# Patient Record
Sex: FEMALE | Race: WHITE | URBAN - METROPOLITAN AREA
[De-identification: names, ages, dates, MRNs, and addresses within clinical notes are randomized per-mention and may not be internally consistent; named-entity substitution may affect disease eponyms.]

---

## 2017-03-20 NOTE — PROGRESS NOTES
[facility] [place]  PROGRESS NOTE  Chief Complaint:   Patient presents with:  Medication Follow-Up: Needs refills      HPI:   This is a 27year old female presents for follow-up on depression and anxiety.   Patient has been tolerating medication chest discomfort, palpitations, edema, dyspnea on exertion or at rest.  RESPIRATORY:  Denies shortness of breath, wheezing, cough or sputum. GASTROINTESTINAL:  Denies abdominal pain, nausea, vomiting, constipation, diarrhea, or blood in stool.   Marilin Gill daily.  -     ALPRAZolam (XANAX) 0.25 MG Oral Tab; Take 1 tablet (0.25 mg total) by mouth 3 (three) times daily as needed for Sleep. Patient Instructions   Increase fluoxetine. Use xanax as needed. Consider seeing a counselor.    Advice healthy diet

## 2017-03-20 NOTE — PATIENT INSTRUCTIONS
Increase fluoxetine. Use xanax as needed. Consider seeing a counselor. Advice healthy diet and exercise.

## 2017-04-27 ENCOUNTER — TELEPHONE (OUTPATIENT)
Dept: FAMILY MEDICINE CLINIC | Facility: CLINIC | Age: 27
End: 2017-04-27

## 2017-04-27 RX ORDER — ALPRAZOLAM 0.25 MG/1
0.25 TABLET ORAL 3 TIMES DAILY PRN
Qty: 30 TABLET | Refills: 1 | Status: SHIPPED | OUTPATIENT
Start: 2017-04-27 | End: 2017-06-15

## 2017-04-27 NOTE — TELEPHONE ENCOUNTER
Future Appointments  Date Time Provider Nuzhat Tammy   6/21/2017 8:30 AM Kait Zapata MD [facility] [place] [facility]     Return in about 3 months (around 6/20/2017).

## 2017-06-05 ENCOUNTER — LAB ENCOUNTER (OUTPATIENT)
Dept: LAB | Age: 27
End: 2017-06-05
Attending: FAMILY MEDICINE
Payer: COMMERCIAL

## 2017-06-05 ENCOUNTER — OFFICE VISIT (OUTPATIENT)
Dept: FAMILY MEDICINE CLINIC | Facility: CLINIC | Age: 27
End: 2017-06-05

## 2017-06-05 ENCOUNTER — TELEPHONE (OUTPATIENT)
Dept: FAMILY MEDICINE CLINIC | Facility: CLINIC | Age: 27
End: 2017-06-05

## 2017-06-05 VITALS
SYSTOLIC BLOOD PRESSURE: 118 MMHG | HEART RATE: 80 BPM | TEMPERATURE: 98 F | BODY MASS INDEX: 45.15 KG/M2 | DIASTOLIC BLOOD PRESSURE: 76 MMHG | WEIGHT: 271 LBS | RESPIRATION RATE: 14 BRPM | HEIGHT: 65 IN

## 2017-06-05 DIAGNOSIS — S09.90XA HEAD TRAUMA, INITIAL ENCOUNTER: Primary | ICD-10-CM

## 2017-06-05 DIAGNOSIS — S09.90XA HEAD TRAUMA, INITIAL ENCOUNTER: ICD-10-CM

## 2017-06-05 DIAGNOSIS — G44.319 ACUTE POST-TRAUMATIC HEADACHE, NOT INTRACTABLE: ICD-10-CM

## 2017-06-05 DIAGNOSIS — R11.0 NAUSEA: ICD-10-CM

## 2017-06-05 PROCEDURE — 36415 COLL VENOUS BLD VENIPUNCTURE: CPT

## 2017-06-05 PROCEDURE — 99214 OFFICE O/P EST MOD 30 MIN: CPT | Performed by: NURSE PRACTITIONER

## 2017-06-05 PROCEDURE — 80048 BASIC METABOLIC PNL TOTAL CA: CPT

## 2017-06-05 PROCEDURE — 85025 COMPLETE CBC W/AUTO DIFF WBC: CPT

## 2017-06-05 RX ORDER — NAPROXEN 500 MG/1
500 TABLET ORAL 2 TIMES DAILY WITH MEALS
Qty: 20 TABLET | Refills: 0 | Status: SHIPPED | OUTPATIENT
Start: 2017-06-05 | End: 2017-09-15 | Stop reason: ALTCHOICE

## 2017-06-05 RX ORDER — NORGESTIMATE-ETHINYL ESTRADIOL 7DAYSX3 28
TABLET ORAL
COMMUNITY
Start: 2017-06-02 | End: 2018-06-20

## 2017-06-05 NOTE — PROGRESS NOTES
[address]  PROGRESS NOTE      Chief Complaint:   Patient presents with:  Headache: hit head on monday the ground on monday has had headache ever since and nausea        HPI:   Alberto Baxter is a 32year old female who presents for c/o Disp: 30 tablet Rfl: 1   FLUoxetine HCl 40 MG Oral Cap Take 1 capsule (40 mg total) by mouth daily.  Disp: 30 capsule Rfl: 2      Past Medical History   Diagnosis Date   • Anxiety    • Depression    • Palpitations           Past Surgical History    CHOLECYS Scale (eye, verbal, motor) score: 15, gait normal, heel to shin test normal.  Psych: A & Ox3, tearing up in room--states this is scary information and she should have come in sooner.     ASSESSMENT AND PLAN:   Facundo Adorno is a 32year old female who pres understanding of these issues and agrees to the plan. The patient is asked to return if sx's persist or worsen.     TIFFANIE Laguna

## 2017-06-05 NOTE — PATIENT INSTRUCTIONS
Recommend physical and cognitive rest. No mental strain including none to limited screening time, loud music, computer or phone time, etc.  If any change in mental status, vomiting, loss of consciousness, severe headache, difficulty staying awake or arouse

## 2017-06-05 NOTE — TELEPHONE ENCOUNTER
Patient calling wanting referral to be sent to [facility] in [address] [place] [address] Dr. Gama Bolden is not in network. Please advise?

## 2017-06-06 ENCOUNTER — TELEPHONE (OUTPATIENT)
Dept: FAMILY MEDICINE CLINIC | Facility: CLINIC | Age: 27
End: 2017-06-06

## 2017-06-06 NOTE — TELEPHONE ENCOUNTER
Patient notified of results and recommendations, states insurance information was given at .  Staff message sent to [facility] central referral for prior auth approval.

## 2017-06-06 NOTE — TELEPHONE ENCOUNTER
Patient notified insurance is out of network would have to be self pay for CT scan. Patient expressed understanding and thanks.

## 2017-06-06 NOTE — TELEPHONE ENCOUNTER
----- Message from TIFFANIE Egan sent at 6/6/2017  7:01 AM CDT -----  CBC: essentially normal, RBC and Hgb stable  BMP wnls  Pt needs to give insurance information to have Ct head and neurology referral authorized.  Please have pt do this with

## 2017-06-08 ENCOUNTER — TELEPHONE (OUTPATIENT)
Dept: FAMILY MEDICINE CLINIC | Facility: CLINIC | Age: 27
End: 2017-06-08

## 2017-06-08 NOTE — TELEPHONE ENCOUNTER
Patient can go to immediate care today to be assessed or can come in tomorrow to assess this new issue. Recommend patient still follow-up with neurology.   Go to ER if troubles breathing, facial swelling etc.

## 2017-06-08 NOTE — TELEPHONE ENCOUNTER
Notified patient of Maria Esther Scott's recommendations expressed understanding and thanks.  States will think this over and call us back tomorrow for an appt

## 2017-06-08 NOTE — TELEPHONE ENCOUNTER
Spoke with patient states her left lymphnode and neck is swollen and sore now. States her headache is better and her nausea resolved. Advised patient she may nees appt for re-evaluation. Please advise?

## 2017-06-15 ENCOUNTER — OFFICE VISIT (OUTPATIENT)
Dept: FAMILY MEDICINE CLINIC | Facility: CLINIC | Age: 27
End: 2017-06-15

## 2017-06-15 VITALS
HEART RATE: 60 BPM | BODY MASS INDEX: 45 KG/M2 | DIASTOLIC BLOOD PRESSURE: 80 MMHG | RESPIRATION RATE: 20 BRPM | TEMPERATURE: 97 F | SYSTOLIC BLOOD PRESSURE: 120 MMHG | WEIGHT: 271 LBS

## 2017-06-15 DIAGNOSIS — S06.0X0D CONCUSSION, WITHOUT LOC, SUBSEQUENT ENCOUNTER: ICD-10-CM

## 2017-06-15 DIAGNOSIS — F32.A DEPRESSION, UNSPECIFIED DEPRESSION TYPE: Primary | ICD-10-CM

## 2017-06-15 DIAGNOSIS — F41.1 ANXIETY STATE: ICD-10-CM

## 2017-06-15 PROCEDURE — 99214 OFFICE O/P EST MOD 30 MIN: CPT | Performed by: FAMILY MEDICINE

## 2017-06-15 RX ORDER — ALPRAZOLAM 0.25 MG/1
0.25 TABLET ORAL 3 TIMES DAILY PRN
Qty: 30 TABLET | Refills: 1 | Status: SHIPPED | OUTPATIENT
Start: 2017-06-15 | End: 2017-08-07

## 2017-06-15 RX ORDER — FLUOXETINE HYDROCHLORIDE 40 MG/1
40 CAPSULE ORAL DAILY
Qty: 30 CAPSULE | Refills: 2 | Status: SHIPPED | OUTPATIENT
Start: 2017-06-15 | End: 2017-09-15

## 2017-06-15 NOTE — PATIENT INSTRUCTIONS
Continue current medications   Concussion symptoms better. Also finish the course of antibiotics for pharyngitis- it is improving. Return to clinic in 1-2 weeks if no improvement. Sooner if symptoms gets worse.

## 2017-06-15 NOTE — PROGRESS NOTES
[address]  PROGRESS NOTE  Chief Complaint:   Patient presents with: Other: concussion   Medication Request      HPI:   This is a 32year old female presents for follow-up and medication refill.   Patient has history of anxiety and depre Absolute Prelim 5.51 1.30-6.70 x10 (3) uL   Neutrophil Absolute 5.51 1.30-6.70 x10(3) uL   Lymphocyte Absolute 2.72 0.90-4.00 x10(3) uL   Monocyte Absolute 0.62 (H) 0.10-0.60 x10(3) uL   Eosinophil Absolute 0.12 0.00-0.30 x10(3) uL   Basophil Absolute 0.07 chest discomfort, palpitations, edema, dyspnea on exertion or at rest.  RESPIRATORY:  Denies shortness of breath, wheezing, cough or sputum. GASTROINTESTINAL:  Denies abdominal pain, nausea, vomiting, constipation, diarrhea, or blood in stool.   Satira Dates Normal ROM, no joint pain, or muscle weakness in all extremity. NEUROLOGICAL:  No deficit, normal gait, strength and tone, sensory intact, normal reflexes.   PSYCHIATRIC: Alert and oriented x 3; affect appropriate, no depressed mood or anxiety      ASSESS

## 2017-08-07 NOTE — TELEPHONE ENCOUNTER
From: Carrie Duverney  Sent: 8/7/2017 3:49 PM CDT  Subject: Medication Renewal Request    Pooja Pedro would like a refill of the following medications:  ALPRAZolam Neli Cooper) 0.25 MG Oral Tab Regina Matos MD]    Preferred pharmacy: [place] [id number]

## 2017-08-07 NOTE — TELEPHONE ENCOUNTER
Future Appointments  Date Time Provider Nuzhat Tammy   9/15/2017 1:00 PM Lele Nash MD [facility] [place] [facility] [place]      Return in about 3 months (around 9/15/2017).

## 2017-08-09 RX ORDER — FLUOXETINE HYDROCHLORIDE 40 MG/1
CAPSULE ORAL
Qty: 30 CAPSULE | Refills: 2 | OUTPATIENT
Start: 2017-08-09

## 2017-08-09 RX ORDER — ALPRAZOLAM 0.25 MG/1
0.25 TABLET ORAL 3 TIMES DAILY PRN
Qty: 30 TABLET | Refills: 1
Start: 2017-08-09

## 2017-08-09 RX ORDER — FLUOXETINE HYDROCHLORIDE 40 MG/1
40 CAPSULE ORAL DAILY
Qty: 30 CAPSULE | Refills: 2
Start: 2017-08-09

## 2017-08-09 RX ORDER — ALPRAZOLAM 0.25 MG/1
0.25 TABLET ORAL 3 TIMES DAILY PRN
Qty: 30 TABLET | Refills: 0
Start: 2017-08-09 | End: 2017-09-15

## 2017-08-09 NOTE — TELEPHONE ENCOUNTER
Future Appointments  Date Time Provider Nuzhat Tammy   9/15/2017 1:00 PM Ary Doyle MD [facility] [place] [facility] [place]      Return in about 3 months (around 9/15/2017).

## 2017-08-09 NOTE — TELEPHONE ENCOUNTER
Will hold refills till patient sees Dr. Paredes Resides next month.  Myrtle Whittington, 08/09/17, 12:50 PM

## 2017-08-09 NOTE — TELEPHONE ENCOUNTER
Patient was given refills at her last appointment. Please have her check with the pharmacy.  Thank you  Myrtle Whittington, 08/09/17, 12:37 PM

## 2017-08-09 NOTE — TELEPHONE ENCOUNTER
From: Kellee Case  Sent: 8/9/2017 10:48 AM CDT  Subject: Medication Renewal Request    Rosita Merlos.  Fredi Shaffer would like a refill of the following medications:  FLUoxetine HCl 40 MG Oral Cap Cristian Michael MD]  ALPRAZolam Jose Oliveira) 0.25 MG Oral Tab Wilfrido Zapata

## 2017-08-14 RX ORDER — ALPRAZOLAM 0.25 MG/1
0.25 TABLET ORAL 3 TIMES DAILY PRN
Qty: 30 TABLET | Refills: 1
Start: 2017-08-14

## 2017-08-14 RX ORDER — ALPRAZOLAM 0.25 MG/1
0.25 TABLET ORAL 3 TIMES DAILY PRN
Qty: 30 TABLET | Refills: 0
Start: 2017-08-14

## 2017-09-15 NOTE — PROGRESS NOTES
[facility] [place]  PROGRESS NOTE  Chief Complaint:   Patient presents with:  Medication Follow-Up      HPI:   This is a 32year old female with history of anxiety and depression presents for follow-up and medication refill.   Patient is currentl LIGATION  Social History:  Smoking status: Never Smoker                                                              Smokeless tobacco: Never Used                      Alcohol use:  No              Social History Narrative    Divorce    One son    [facility] alert, awake and oriented, well developed, well nourished, no apparent distress. EYES: PERRLA, EOMI, sclera anicteric, conjunctiva normal.  LUNGS: Clear to auscultation bilterally, no rales/rhonchi/wheezing.   HEART:  Regular rate and rhythm, no murmurs, MD

## 2017-09-15 NOTE — PATIENT INSTRUCTIONS
Continue current medications   Doing well. Consider decreasing alprazolam   Consider seeing a counselor . Return to clinic if symptoms worse.

## 2017-10-24 NOTE — TELEPHONE ENCOUNTER
Future appt:    Last Appointment:  9/15/2017 with Dr. Se Elias depression / anxiety  No results found for: CHOLEST, HDL, LDL, TRIGLY, TRIG  No results found for: EAG, A1C  No results found for: T4F, TSH, TSHT4    Lab Results  Component Value Date   GLU 76 06

## 2017-10-28 RX ORDER — ALPRAZOLAM 0.25 MG/1
TABLET ORAL
Qty: 30 TABLET | Refills: 0 | Status: SHIPPED
Start: 2017-10-28 | End: 2017-12-28

## 2017-10-28 NOTE — TELEPHONE ENCOUNTER
Dr. Ricardo Montaño is out of the office today. Refill for #30 done. Please fax and let patient know. Thank you.  Myrtle Whittington, 10/28/17, 9:08 AM

## 2017-10-30 ENCOUNTER — TELEPHONE (OUTPATIENT)
Dept: FAMILY MEDICINE CLINIC | Facility: CLINIC | Age: 27
End: 2017-10-30

## 2017-10-30 RX ORDER — ALPRAZOLAM 0.25 MG/1
TABLET ORAL
Qty: 30 TABLET | Refills: 0 | OUTPATIENT
Start: 2017-10-30

## 2017-12-28 RX ORDER — FLUOXETINE HYDROCHLORIDE 40 MG/1
CAPSULE ORAL
Qty: 30 CAPSULE | Refills: 2 | Status: SHIPPED | OUTPATIENT
Start: 2017-12-28 | End: 2018-06-20

## 2017-12-28 RX ORDER — ALPRAZOLAM 0.25 MG/1
TABLET ORAL
Qty: 30 TABLET | Refills: 0 | Status: SHIPPED | OUTPATIENT
Start: 2017-12-28 | End: 2018-06-20

## 2017-12-28 NOTE — TELEPHONE ENCOUNTER
Future appt:  None  Last Appointment:  9/15/2017; No f/u recommended    No results found for: CHOLEST, HDL, LDL, TRIGLY, TRIG  No results found for: EAG, A1C  No results found for: T4F, TSH, TSHT4    Last Labs:  6/5/2017  Last RF:  9/15/2017    No Follow-u

## 2018-06-15 RX ORDER — ALPRAZOLAM 0.25 MG/1
TABLET ORAL
Qty: 30 TABLET | Refills: 0 | OUTPATIENT
Start: 2018-06-15

## 2018-06-20 ENCOUNTER — OFFICE VISIT (OUTPATIENT)
Dept: FAMILY MEDICINE CLINIC | Facility: CLINIC | Age: 28
End: 2018-06-20

## 2018-06-20 VITALS
SYSTOLIC BLOOD PRESSURE: 110 MMHG | DIASTOLIC BLOOD PRESSURE: 62 MMHG | TEMPERATURE: 99 F | WEIGHT: 265.38 LBS | BODY MASS INDEX: 46.44 KG/M2 | RESPIRATION RATE: 16 BRPM | HEIGHT: 63.5 IN | HEART RATE: 64 BPM

## 2018-06-20 DIAGNOSIS — F41.1 ANXIETY STATE: ICD-10-CM

## 2018-06-20 DIAGNOSIS — F32.A DEPRESSION, UNSPECIFIED DEPRESSION TYPE: Primary | ICD-10-CM

## 2018-06-20 PROCEDURE — 99213 OFFICE O/P EST LOW 20 MIN: CPT | Performed by: FAMILY MEDICINE

## 2018-06-20 RX ORDER — FLUOXETINE HYDROCHLORIDE 40 MG/1
CAPSULE ORAL
Qty: 90 CAPSULE | Refills: 0 | Status: SHIPPED | OUTPATIENT
Start: 2018-06-20 | End: 2018-08-17

## 2018-06-20 RX ORDER — DIAZEPAM 2 MG/1
1 TABLET ORAL EVERY 12 HOURS PRN
Qty: 30 TABLET | Refills: 0 | Status: SHIPPED | OUTPATIENT
Start: 2018-06-20 | End: 2018-09-13

## 2018-06-20 NOTE — PATIENT INSTRUCTIONS
Restart fluoxetine. Recommend daily exercise, plenty of sunlight. Consider seeing a counselor. Return to clinic if any concern.

## 2018-06-20 NOTE — PROGRESS NOTES
[address]  PROGRESS NOTE  Chief Complaint:   Patient presents with: Follow - Up: requesting to restart anti depression meds      HPI:   This is a 25year old female with history of depression and anxiety presents for follow-up.   Mily History:  2010: CHOLECYSTECTOMY  2014: TUBAL LIGATION  Social History:  Smoking status: Never Smoker                                                              Smokeless tobacco: Never Used                      Alcohol use:  No              Social History encounter: 265 lb 6.4 oz. Vital signs reviewed. Physical Exam:  GEN:  Patient is alert, awake and oriented, well developed, well nourished, no apparent distress.    EYES: PERRLA, EOMI, sclera anicteric, conjunctiva normal.  HEAD:  Normocephalic, atraumat learning. Medical education done. Outcome: Patient verbalizes understanding. Patient is notified to call with any questions, complications, allergies, or worsening or changing symptoms.   Patient is to call with any side effects or complications from the

## 2018-08-17 NOTE — PATIENT INSTRUCTIONS
Continue current medications   Recommend to start paxil. Stop fluoxetine   See counselor. Check labs. Return to clinic if symptoms worse.

## 2018-08-17 NOTE — PROGRESS NOTES
[address]  PROGRESS NOTE  Chief Complaint:   Patient presents with: Follow - Up      HPI:   This is a 25year old female with history of anxiety and depression presents for follow-up and medication refill.   Patient's symptoms are getti breath, wheezing, cough or sputum. GASTROINTESTINAL:  Denies abdominal pain, nausea, vomiting, constipation, diarrhea, or blood in stool. MUSCULOSKELETAL:  Denies weakness, muscle aches, back pain, joint pain, swelling or stiffness.   NEUROLOGICAL:  Denie CBC WITH DIFFERENTIAL WITH PLATELET  -     COMP METABOLIC PANEL (14)    Depression, unspecified depression type  -     CBC WITH DIFFERENTIAL WITH PLATELET  -     COMP METABOLIC PANEL (14)    Other orders  -     PARoxetine HCl (PAXIL) 20 MG Oral Tab;  Take 1

## 2018-09-12 ENCOUNTER — TELEPHONE (OUTPATIENT)
Dept: FAMILY MEDICINE CLINIC | Facility: CLINIC | Age: 28
End: 2018-09-12

## 2018-09-12 NOTE — TELEPHONE ENCOUNTER
Patient was seen 8/17 and started on Paxil. Patient states that since taking the Paxil she hasn't been sleeping, gets constant headaches, having terrifying dreams, and is constipated.     Patient is wondering if she could be switched to something else or

## 2018-09-12 NOTE — TELEPHONE ENCOUNTER
Let pt know the following below. Pt verbalized her understanding and had no other questions at this time.    Future Appointments   Date Time Provider Nuzhat Munoz   9/13/2018  8:00 AM Ary Doyle MD [facility] [place] [facility] [place]   11/19/2018  6:00 PM Mo

## 2018-09-13 NOTE — PATIENT INSTRUCTIONS
Switch medication to bupropion. Start ambien at night time as needed   Recommend to see counselor asap. Recheck in 1 month. Sooner if symptoms worse.

## 2018-09-13 NOTE — PROGRESS NOTES
[facility] [place]  PROGRESS NOTE  Chief Complaint:   Patient presents with:  Medication Follow-Up      HPI:   This is a 25year old female presents for follow up. She is not tolerating paxil well. Has constipation issue, feels nauseated.  Still pain, joint pain, swelling or stiffness. HEMATOLOGIC:  Denies anemia, bleeding or bruising.   PSYCHIATRIC:  See HPI      EXAM:   /72 (BP Location: Left arm, Patient Position: Sitting, Cuff Size: large)   Pulse 56   Temp 97.6 °F (36.4 °C) (Temporal) see counselor asap. Recheck in 1 month. Sooner if symptoms worse.        Health Maintenance:  Annual Physical due on 05/20/1990  Pap Smear,3 Years due on 05/20/2009  Annual Depression Screen due on 06/05/2018  Influenza Vaccine(1) due on 09/01/2018    Karen Medina

## 2018-10-13 ENCOUNTER — OFFICE VISIT (OUTPATIENT)
Dept: FAMILY MEDICINE CLINIC | Facility: CLINIC | Age: 28
End: 2018-10-13

## 2018-10-13 VITALS
HEIGHT: 64 IN | TEMPERATURE: 97 F | HEART RATE: 80 BPM | SYSTOLIC BLOOD PRESSURE: 104 MMHG | DIASTOLIC BLOOD PRESSURE: 76 MMHG | BODY MASS INDEX: 47 KG/M2

## 2018-10-13 DIAGNOSIS — F41.1 ANXIETY STATE: ICD-10-CM

## 2018-10-13 DIAGNOSIS — F32.A DEPRESSION, UNSPECIFIED DEPRESSION TYPE: Primary | ICD-10-CM

## 2018-10-13 PROCEDURE — 99213 OFFICE O/P EST LOW 20 MIN: CPT | Performed by: FAMILY MEDICINE

## 2018-10-13 RX ORDER — ZOLPIDEM TARTRATE 10 MG/1
5 TABLET ORAL NIGHTLY PRN
Qty: 20 TABLET | Refills: 0 | Status: SHIPPED | OUTPATIENT
Start: 2018-10-13 | End: 2019-08-23

## 2018-10-13 RX ORDER — FLUOXETINE HYDROCHLORIDE 40 MG/1
CAPSULE ORAL
Qty: 90 CAPSULE | Refills: 0 | COMMUNITY
Start: 2018-10-13 | End: 2019-01-17

## 2018-10-13 NOTE — PROGRESS NOTES
[address]  PROGRESS NOTE  Chief Complaint:   Patient presents with: Follow - Up      HPI:   This is a 25year old female with history of anxiety and depression presents for follow-up.   Patient has not been taking bupropion, either she palpitations, edema, dyspnea on exertion or at rest.  RESPIRATORY:  Denies shortness of breath, wheezing, cough or sputum. GASTROINTESTINAL:  Denies abdominal pain, nausea, vomiting, constipation, diarrhea, or blood in stool.   MUSCULOSKELETAL:  Denies wea orders for this visit:    Depression, unspecified depression type    Anxiety state    Other orders  -     Zolpidem Tartrate 10 MG Oral Tab; Take 0.5 tablets (5 mg total) by mouth nightly as needed for Sleep.         Patient Instructions   Restart fluoxetine

## 2019-01-17 ENCOUNTER — OFFICE VISIT (OUTPATIENT)
Dept: FAMILY MEDICINE CLINIC | Facility: CLINIC | Age: 29
End: 2019-01-17

## 2019-01-17 VITALS
HEART RATE: 62 BPM | TEMPERATURE: 98 F | BODY MASS INDEX: 48.14 KG/M2 | RESPIRATION RATE: 18 BRPM | HEIGHT: 64 IN | DIASTOLIC BLOOD PRESSURE: 74 MMHG | SYSTOLIC BLOOD PRESSURE: 110 MMHG | WEIGHT: 282 LBS

## 2019-01-17 DIAGNOSIS — M25.551 RIGHT HIP PAIN: Primary | ICD-10-CM

## 2019-01-17 DIAGNOSIS — M54.50 ACUTE RIGHT-SIDED LOW BACK PAIN WITHOUT SCIATICA: ICD-10-CM

## 2019-01-17 PROCEDURE — 99214 OFFICE O/P EST MOD 30 MIN: CPT | Performed by: FAMILY MEDICINE

## 2019-01-17 RX ORDER — MELOXICAM 7.5 MG/1
7.5 TABLET ORAL 2 TIMES DAILY
Qty: 30 TABLET | Refills: 0 | Status: SHIPPED | OUTPATIENT
Start: 2019-01-17 | End: 2019-01-26

## 2019-01-17 RX ORDER — CYCLOBENZAPRINE HCL 5 MG
5 TABLET ORAL NIGHTLY PRN
Qty: 15 TABLET | Refills: 0 | Status: SHIPPED | OUTPATIENT
Start: 2019-01-17 | End: 2019-01-26

## 2019-01-17 NOTE — PATIENT INSTRUCTIONS
Recommend rest, ice or heating pad as needed   Use meloxicam as needed   Use cyclobenzaprine as needed   Medication make you drowsy, so no driving after taking medication. Do not take cyclobenzaprine with ambien. Start physical therapy.    See Dr Hua Dior i

## 2019-01-17 NOTE — PROGRESS NOTES
[facility] [place]  PROGRESS NOTE  Chief Complaint:   Patient presents with:  Hip Pain: R hip and upper leg pain, few weeks      HPI:   This is a 25year old female presents complaining of pain in her right hip and right lower back that has been lesion, or excessive skin dryness. CARDIOVASCULAR:  Denies chest pain, chest pressure, chest discomfort, palpitations, edema, dyspnea on exertion or at rest.  RESPIRATORY:  Denies shortness of breath, wheezing, cough or sputum.   GASTROINTESTINAL:  Denies EXTERNAL    Acute right-sided low back pain without sciatica  -     PHYSICAL THERAPY EXTERNAL    Other orders  -     Meloxicam 7.5 MG Oral Tab; Take 1 tablet (7.5 mg total) by mouth 2 (two) times daily. -     Cyclobenzaprine HCl 5 MG Oral Tab;  Take 1 tabl

## 2019-01-26 RX ORDER — CYCLOBENZAPRINE HCL 5 MG
TABLET ORAL
Qty: 15 TABLET | Refills: 0 | Status: SHIPPED | OUTPATIENT
Start: 2019-01-26 | End: 2019-08-23

## 2019-01-26 RX ORDER — MELOXICAM 7.5 MG/1
TABLET ORAL
Qty: 30 TABLET | Refills: 0 | Status: SHIPPED | OUTPATIENT
Start: 2019-01-26 | End: 2019-08-23

## 2019-08-23 ENCOUNTER — OFFICE VISIT (OUTPATIENT)
Dept: FAMILY MEDICINE CLINIC | Facility: CLINIC | Age: 29
End: 2019-08-23
Payer: COMMERCIAL

## 2019-08-23 VITALS
HEART RATE: 62 BPM | WEIGHT: 288.63 LBS | TEMPERATURE: 98 F | BODY MASS INDEX: 49.28 KG/M2 | DIASTOLIC BLOOD PRESSURE: 80 MMHG | HEIGHT: 64 IN | SYSTOLIC BLOOD PRESSURE: 110 MMHG | RESPIRATION RATE: 18 BRPM | OXYGEN SATURATION: 98 %

## 2019-08-23 DIAGNOSIS — M54.30 BACK PAIN WITH SCIATICA: Primary | ICD-10-CM

## 2019-08-23 DIAGNOSIS — M54.9 BACK PAIN WITH SCIATICA: Primary | ICD-10-CM

## 2019-08-23 PROCEDURE — 99214 OFFICE O/P EST MOD 30 MIN: CPT | Performed by: NURSE PRACTITIONER

## 2019-08-23 RX ORDER — METHYLPREDNISOLONE 4 MG/1
TABLET ORAL
Qty: 1 KIT | Refills: 0 | Status: SHIPPED | OUTPATIENT
Start: 2019-08-23 | End: 2020-09-16

## 2019-08-23 RX ORDER — MELOXICAM 15 MG/1
15 TABLET ORAL DAILY
Qty: 30 TABLET | Refills: 2 | Status: SHIPPED | OUTPATIENT
Start: 2019-08-23 | End: 2020-09-16

## 2019-08-23 RX ORDER — ACETAMINOPHEN AND CODEINE PHOSPHATE 300; 30 MG/1; MG/1
1 TABLET ORAL EVERY 4 HOURS PRN
Qty: 30 TABLET | Refills: 0 | Status: SHIPPED | OUTPATIENT
Start: 2019-08-23 | End: 2019-08-30

## 2019-08-23 NOTE — PATIENT INSTRUCTIONS
Take Mobic daily- take medrol pack as directed    Follow up for an x-ray of your back     Follow up  With physical therapy      Use tylenol with codeine sparingly - take with food     Follow up if symptoms persist or increase

## 2019-08-23 NOTE — PROGRESS NOTES
Kellee Case is a 30year old female. Patient presents with:  Pain: in Thigh since sat. HPI:   Complaints of pain to right leg -  Since  Saturday-  No injury. No moving heavy objects  Went to [facility], - yesterday- adjusted her.    Pain has bee °F (36.7 °C) (Tympanic)   Resp 18   Ht 64\"   Wt 288 lb 9.6 oz   LMP 08/14/2019   SpO2 98%   BMI 49.54 kg/m²   GENERAL: well developed, well nourished,in no apparent distress  SKIN: no rashes,no suspicious lesions  LUNGS: normal rate without respiratory di

## 2019-08-30 RX ORDER — ACETAMINOPHEN AND CODEINE PHOSPHATE 300; 30 MG/1; MG/1
TABLET ORAL
Qty: 15 TABLET | Refills: 0 | Status: SHIPPED
Start: 2019-08-30 | End: 2020-09-16

## 2019-08-30 NOTE — TELEPHONE ENCOUNTER
Future appt:    Last Appointment:  8/23/2019 Richa Dumont. No results found for: CHOLEST, HDL, LDL, TRIGLY, TRIG  No results found for: EAG, A1C  No results found for: T4F, TSH, TSHT4    No follow-ups on file.

## 2019-08-31 ENCOUNTER — TELEPHONE (OUTPATIENT)
Dept: FAMILY MEDICINE CLINIC | Facility: CLINIC | Age: 29
End: 2019-08-31

## 2019-08-31 NOTE — TELEPHONE ENCOUNTER
Called in Rx and informed patient that she could call pharmacy today for . No other questions at this time.

## 2019-08-31 NOTE — TELEPHONE ENCOUNTER
RF RX -  pharmacy does not have RF auth    re: Acedophonine COD       to   [facility]     please advise

## 2019-10-10 RX ORDER — ZOLPIDEM TARTRATE 10 MG/1
TABLET ORAL
Qty: 20 TABLET | Refills: 0 | Status: SHIPPED | OUTPATIENT
Start: 2019-10-10 | End: 2020-09-16

## 2020-09-16 ENCOUNTER — OFFICE VISIT (OUTPATIENT)
Dept: FAMILY MEDICINE CLINIC | Facility: CLINIC | Age: 30
End: 2020-09-16

## 2020-09-16 VITALS
RESPIRATION RATE: 18 BRPM | SYSTOLIC BLOOD PRESSURE: 112 MMHG | BODY MASS INDEX: 49.17 KG/M2 | WEIGHT: 288 LBS | HEIGHT: 64 IN | TEMPERATURE: 98 F | HEART RATE: 98 BPM | OXYGEN SATURATION: 98 % | DIASTOLIC BLOOD PRESSURE: 68 MMHG

## 2020-09-16 DIAGNOSIS — F41.1 ANXIETY STATE: ICD-10-CM

## 2020-09-16 DIAGNOSIS — G47.09 OTHER INSOMNIA: ICD-10-CM

## 2020-09-16 DIAGNOSIS — F32.A DEPRESSION, UNSPECIFIED DEPRESSION TYPE: Primary | ICD-10-CM

## 2020-09-16 DIAGNOSIS — E66.01 CLASS 3 SEVERE OBESITY WITHOUT SERIOUS COMORBIDITY WITH BODY MASS INDEX (BMI) OF 45.0 TO 49.9 IN ADULT, UNSPECIFIED OBESITY TYPE (HCC): ICD-10-CM

## 2020-09-16 PROBLEM — E66.813 CLASS 3 SEVERE OBESITY WITHOUT SERIOUS COMORBIDITY WITH BODY MASS INDEX (BMI) OF 45.0 TO 49.9 IN ADULT: Status: ACTIVE | Noted: 2020-09-16

## 2020-09-16 PROBLEM — E66.813 CLASS 3 SEVERE OBESITY WITHOUT SERIOUS COMORBIDITY WITH BODY MASS INDEX (BMI) OF 45.0 TO 49.9 IN ADULT (HCC): Status: ACTIVE | Noted: 2020-09-16

## 2020-09-16 LAB
ALBUMIN SERPL-MCNC: 3.6 G/DL (ref 3.4–5)
ALBUMIN/GLOB SERPL: 0.9 {RATIO} (ref 1–2)
ALP LIVER SERPL-CCNC: 67 U/L (ref 37–98)
ALT SERPL-CCNC: 26 U/L (ref 13–56)
ANION GAP SERPL CALC-SCNC: 6 MMOL/L (ref 0–18)
AST SERPL-CCNC: 15 U/L (ref 15–37)
BASOPHILS # BLD AUTO: 0.07 X10(3) UL (ref 0–0.2)
BASOPHILS NFR BLD AUTO: 0.6 %
BILIRUB SERPL-MCNC: 0.3 MG/DL (ref 0.1–2)
BUN BLD-MCNC: 9 MG/DL (ref 7–18)
BUN/CREAT SERPL: 13 (ref 10–20)
CALCIUM BLD-MCNC: 9.5 MG/DL (ref 8.5–10.1)
CHLORIDE SERPL-SCNC: 106 MMOL/L (ref 98–112)
CO2 SERPL-SCNC: 26 MMOL/L (ref 21–32)
CREAT BLD-MCNC: 0.69 MG/DL (ref 0.55–1.02)
DEPRECATED RDW RBC AUTO: 40.8 FL (ref 35.1–46.3)
EOSINOPHIL # BLD AUTO: 0.1 X10(3) UL (ref 0–0.7)
EOSINOPHIL NFR BLD AUTO: 0.9 %
ERYTHROCYTE [DISTWIDTH] IN BLOOD BY AUTOMATED COUNT: 12.9 % (ref 11–15)
EST. AVERAGE GLUCOSE BLD GHB EST-MCNC: 108 MG/DL (ref 68–126)
GLOBULIN PLAS-MCNC: 4 G/DL (ref 2.8–4.4)
GLUCOSE BLD-MCNC: 89 MG/DL (ref 70–99)
HBA1C MFR BLD HPLC: 5.4 % (ref ?–5.7)
HCT VFR BLD AUTO: 42.3 % (ref 35–48)
HGB BLD-MCNC: 13.9 G/DL (ref 12–16)
IMM GRANULOCYTES # BLD AUTO: 0.02 X10(3) UL (ref 0–1)
IMM GRANULOCYTES NFR BLD: 0.2 %
LYMPHOCYTES # BLD AUTO: 3.46 X10(3) UL (ref 1–4)
LYMPHOCYTES NFR BLD AUTO: 31.4 %
M PROTEIN MFR SERPL ELPH: 7.6 G/DL (ref 6.4–8.2)
MCH RBC QN AUTO: 28.7 PG (ref 26–34)
MCHC RBC AUTO-ENTMCNC: 32.9 G/DL (ref 31–37)
MCV RBC AUTO: 87.4 FL (ref 80–100)
MONOCYTES # BLD AUTO: 0.87 X10(3) UL (ref 0.1–1)
MONOCYTES NFR BLD AUTO: 7.9 %
NEUTROPHILS # BLD AUTO: 6.49 X10 (3) UL (ref 1.5–7.7)
NEUTROPHILS # BLD AUTO: 6.49 X10(3) UL (ref 1.5–7.7)
NEUTROPHILS NFR BLD AUTO: 59 %
OSMOLALITY SERPL CALC.SUM OF ELEC: 284 MOSM/KG (ref 275–295)
PATIENT FASTING Y/N/NP: NO
PLATELET # BLD AUTO: 265 10(3)UL (ref 150–450)
POTASSIUM SERPL-SCNC: 4 MMOL/L (ref 3.5–5.1)
RBC # BLD AUTO: 4.84 X10(6)UL (ref 3.8–5.3)
SODIUM SERPL-SCNC: 138 MMOL/L (ref 136–145)
TSI SER-ACNC: 2.9 MIU/ML (ref 0.36–3.74)
VIT B12 SERPL-MCNC: 629 PG/ML (ref 193–986)
WBC # BLD AUTO: 11 X10(3) UL (ref 4–11)

## 2020-09-16 PROCEDURE — 80053 COMPREHEN METABOLIC PANEL: CPT | Performed by: FAMILY MEDICINE

## 2020-09-16 PROCEDURE — 99214 OFFICE O/P EST MOD 30 MIN: CPT | Performed by: FAMILY MEDICINE

## 2020-09-16 PROCEDURE — 3074F SYST BP LT 130 MM HG: CPT | Performed by: FAMILY MEDICINE

## 2020-09-16 PROCEDURE — 85025 COMPLETE CBC W/AUTO DIFF WBC: CPT | Performed by: FAMILY MEDICINE

## 2020-09-16 PROCEDURE — 3078F DIAST BP <80 MM HG: CPT | Performed by: FAMILY MEDICINE

## 2020-09-16 PROCEDURE — 3008F BODY MASS INDEX DOCD: CPT | Performed by: FAMILY MEDICINE

## 2020-09-16 PROCEDURE — 82607 VITAMIN B-12: CPT | Performed by: FAMILY MEDICINE

## 2020-09-16 PROCEDURE — 83036 HEMOGLOBIN GLYCOSYLATED A1C: CPT | Performed by: FAMILY MEDICINE

## 2020-09-16 PROCEDURE — 84443 ASSAY THYROID STIM HORMONE: CPT | Performed by: FAMILY MEDICINE

## 2020-09-16 RX ORDER — ZOLPIDEM TARTRATE 10 MG/1
10 TABLET ORAL NIGHTLY PRN
Qty: 15 TABLET | Refills: 0 | Status: SHIPPED | OUTPATIENT
Start: 2020-09-16 | End: 2020-11-25

## 2020-09-16 RX ORDER — ESCITALOPRAM OXALATE 10 MG/1
10 TABLET ORAL DAILY
Qty: 30 TABLET | Refills: 0 | Status: SHIPPED | OUTPATIENT
Start: 2020-09-16 | End: 2020-10-14

## 2020-09-16 NOTE — PROGRESS NOTES
[address]  PROGRESS NOTE  Chief Complaint:   Patient presents with: Anxiety  Depression  Sleep Problem      HPI:   This is a 26year old female with history of depression and anxiety presents for follow-up.   In the past patient has tri See HPI  CARDIOVASCULAR:  Denies chest pain, chest pressure, chest discomfort, palpitations, edema, dyspnea on exertion or at rest.  RESPIRATORY:  Denies shortness of breath, wheezing, cough or sputum.   GASTROINTESTINAL:  Denies abdominal pain, nausea, vom problem.     Diagnoses and all orders for this visit:    Depression, unspecified depression type  -     OP REFERRAL TO [facility]  -     OP REFERRAL TO [facility]  -     VITAMIN B12  -     COMP METABOLIC PANEL (14)  -     TSH W REFLEX TO FREE T4  - Patient is to call with any side effects or complications from the treatments as a result of today.      Problem List:  Patient Active Problem List:     Anxiety state     Depression     Class 3 severe obesity without serious comorbidity with body mass index

## 2020-09-16 NOTE — PATIENT INSTRUCTIONS
Start lexapro. See Ottoniel Cedillo for counseling. Check labs today. Use ambien as needed for sleep. Sleep Hygine:    Develope a bedtime routine. Go to Bed the same time every day.    Avoid caffiene, nicotine and alcohol after 1 pm   Do not eat hea

## 2020-10-14 ENCOUNTER — PATIENT MESSAGE (OUTPATIENT)
Dept: FAMILY MEDICINE CLINIC | Facility: CLINIC | Age: 30
End: 2020-10-14

## 2020-10-14 RX ORDER — ESCITALOPRAM OXALATE 10 MG/1
10 TABLET ORAL DAILY
Qty: 30 TABLET | Refills: 0 | Status: SHIPPED | OUTPATIENT
Start: 2020-10-14 | End: 2020-11-16

## 2020-10-14 NOTE — TELEPHONE ENCOUNTER
Future appt:     Your appointments     Date & Time Appointment Department [facility])    Oct 28, 2020  9:20 AM CDT Follow up Visit with Jeovany Talavera MD [facility] [place] ([facility])    Please arrive 15 minutes p

## 2020-10-14 NOTE — TELEPHONE ENCOUNTER
From: Drew Mcarthur  To: Amie Gibson MD  Sent: 10/14/2020 11:05 AM CDT  Subject: Prescription Question    Good Morning,  I have a follow up appointment scheduled for 2 weeks from today but I need my anxiety medication refilled before then.  Can this be d

## 2020-11-16 RX ORDER — ESCITALOPRAM OXALATE 10 MG/1
TABLET ORAL
Qty: 30 TABLET | Refills: 0 | Status: SHIPPED | OUTPATIENT
Start: 2020-11-16 | End: 2020-11-25

## 2020-11-25 ENCOUNTER — OFFICE VISIT (OUTPATIENT)
Dept: FAMILY MEDICINE CLINIC | Facility: CLINIC | Age: 30
End: 2020-11-25

## 2020-11-25 VITALS
HEART RATE: 60 BPM | BODY MASS INDEX: 48.83 KG/M2 | RESPIRATION RATE: 16 BRPM | SYSTOLIC BLOOD PRESSURE: 106 MMHG | TEMPERATURE: 98 F | OXYGEN SATURATION: 98 % | DIASTOLIC BLOOD PRESSURE: 68 MMHG | WEIGHT: 286 LBS | HEIGHT: 64 IN

## 2020-11-25 DIAGNOSIS — F41.1 ANXIETY STATE: ICD-10-CM

## 2020-11-25 DIAGNOSIS — F32.A DEPRESSION, UNSPECIFIED DEPRESSION TYPE: Primary | ICD-10-CM

## 2020-11-25 PROCEDURE — 3078F DIAST BP <80 MM HG: CPT | Performed by: FAMILY MEDICINE

## 2020-11-25 PROCEDURE — 99214 OFFICE O/P EST MOD 30 MIN: CPT | Performed by: FAMILY MEDICINE

## 2020-11-25 PROCEDURE — 3008F BODY MASS INDEX DOCD: CPT | Performed by: FAMILY MEDICINE

## 2020-11-25 PROCEDURE — 3074F SYST BP LT 130 MM HG: CPT | Performed by: FAMILY MEDICINE

## 2020-11-25 RX ORDER — ALPRAZOLAM 0.25 MG/1
0.25 TABLET ORAL 2 TIMES DAILY PRN
Qty: 15 TABLET | Refills: 1 | Status: SHIPPED | OUTPATIENT
Start: 2020-11-25 | End: 2020-12-31

## 2020-11-25 RX ORDER — ESCITALOPRAM OXALATE 20 MG/1
20 TABLET ORAL DAILY
Qty: 30 TABLET | Refills: 2 | Status: SHIPPED | OUTPATIENT
Start: 2020-11-25 | End: 2020-12-31

## 2020-11-25 RX ORDER — ZOLPIDEM TARTRATE 10 MG/1
10 TABLET ORAL NIGHTLY PRN
Qty: 15 TABLET | Refills: 0 | Status: SHIPPED | OUTPATIENT
Start: 2020-11-25 | End: 2020-12-18

## 2020-11-25 NOTE — PATIENT INSTRUCTIONS
Continue current medications  Increase lexapro to 20 mg.   Start xanax. Restart counseling. Recommend healthy diet, exercise. Do not take ambien with xanax. Medication make you drowsy, no driving after taking medication.

## 2020-11-25 NOTE — PROGRESS NOTES
[facility] [place]  PROGRESS NOTE  Chief Complaint:   Patient presents with:  Depression: [id number]  Anxiety      HPI:   This is a 26year old female with depression, anxiety presents for follow-up.   Patient has been compliant with her medica rest.  RESPIRATORY:  Denies shortness of breath, wheezing, cough or sputum. GASTROINTESTINAL:  Denies abdominal pain, nausea, vomiting, constipation, diarrhea, or blood in stool.   MUSCULOSKELETAL:  Denies weakness, muscle aches, back pain, joint pain, swe as needed for Anxiety. -     Zolpidem Tartrate 10 MG Oral Tab; Take 1 tablet (10 mg total) by mouth nightly as needed for Sleep. Patient Instructions   Continue current medications  Increase lexapro to 20 mg.   Start xanax. Restart counseling.

## 2020-12-18 RX ORDER — ZOLPIDEM TARTRATE 10 MG/1
TABLET ORAL
Qty: 15 TABLET | Refills: 0 | Status: SHIPPED | OUTPATIENT
Start: 2020-12-18 | End: 2020-12-31

## 2020-12-18 NOTE — TELEPHONE ENCOUNTER
Future appt:    Last Appointment with provider:   11/25/2020(Depression)-F/U in 3 months  Last appointment at [facility] [place]:  11/25/2020    Zolpidem Tartrate 10 MG Oral Tab    15tabs  0refills      Filled:11/25/20 Summary: Take 1 tablet (10 mg total) by shanna

## 2020-12-31 RX ORDER — ALPRAZOLAM 0.25 MG/1
0.25 TABLET ORAL 2 TIMES DAILY PRN
Qty: 15 TABLET | Refills: 0 | Status: SHIPPED | OUTPATIENT
Start: 2020-12-31 | End: 2021-02-17

## 2020-12-31 RX ORDER — ZOLPIDEM TARTRATE 10 MG/1
TABLET ORAL
Qty: 15 TABLET | Refills: 0 | Status: SHIPPED | OUTPATIENT
Start: 2020-12-31 | End: 2021-01-29

## 2020-12-31 RX ORDER — ESCITALOPRAM OXALATE 20 MG/1
TABLET ORAL
Qty: 90 TABLET | Refills: 0 | Status: SHIPPED | OUTPATIENT
Start: 2020-12-31 | End: 2021-03-29

## 2020-12-31 NOTE — TELEPHONE ENCOUNTER
Future appt:    Last Appointment with provider:   11/25/2020- depression/anxiety-return is 3 months  Last appointment at [facility] [place]:  11/25/2020    ALPRAZolam 0.25 MG Oral Tab-Sig:   Take 1 tablet (0.25 mg total) by mouth 2 (two) times daily as needed fo

## 2020-12-31 NOTE — TELEPHONE ENCOUNTER
Future appt:     Last Appointment with provider:   11/25/2020; Return in about 3 months (around 2/25/2021).     Last appointment at [facility] [place]:  11/25/2020  No results found for: CHOLEST, HDL, LDL, TRIGLY, TRIG  Lab Results   Component Value Date    EAG 1

## 2021-01-30 RX ORDER — ZOLPIDEM TARTRATE 10 MG/1
10 TABLET ORAL NIGHTLY PRN
Qty: 15 TABLET | Refills: 0 | Status: SHIPPED | OUTPATIENT
Start: 2021-01-30 | End: 2021-02-17

## 2021-01-30 NOTE — TELEPHONE ENCOUNTER
Dr. Getachew Freeman is out of the office today. One refill done. Please let patient know and that she is due for her recheck in the next month. Thank you.

## 2021-01-30 NOTE — TELEPHONE ENCOUNTER
Patient notified and f/u appt scheduled.      Future Appointments   Date Time Provider Nuzhat Tammy   2/17/2021  4:20 PM Erik Barlow MD [facility] [place] [facility]

## 2021-01-30 NOTE — TELEPHONE ENCOUNTER
Please advise refills: At last exam:   Return in about 3 months (around 2/25/2021    Last refill: 12/31/20 # 15 No refills    Spoke with patient. She states medication greatly helped. She states if she does not take it, she can not sleep.  Denies any si

## 2021-02-17 NOTE — PATIENT INSTRUCTIONS
Continue current medications  Anxiety and insomnia unchanged. Monitor hearing, may consider claritin or zyrtec. Return to clinic or see ENT if symptoms worse.

## 2021-02-18 NOTE — PROGRESS NOTES
[facility] [place]  PROGRESS NOTE  Chief Complaint:   Patient presents with:  Medication Follow-Up      HPI:   This is a 26year old female with anxiety, insomnia and depression presents for follow-up and medication refill.   Patient has been com Denies shortness of breath, wheezing, cough or sputum. GASTROINTESTINAL:  Denies abdominal pain, nausea, vomiting, constipation, diarrhea, or blood in stool. MUSCULOSKELETAL:  Denies weakness, muscle aches, back pain, joint pain, swelling or stiffness. disorder ([facility].)    Other orders  -     ALPRAZolam 0.25 MG Oral Tab; Take 1 tablet (0.25 mg total) by mouth 2 (two) times daily as needed for Anxiety. -     Zolpidem Tartrate 10 MG Oral Tab; Take 1 tablet (10 mg total) by mouth nightly as needed for Sleep.

## 2021-03-08 RX ORDER — ZOLPIDEM TARTRATE 10 MG/1
10 TABLET ORAL NIGHTLY PRN
Qty: 30 TABLET | Refills: 2 | Status: SHIPPED | OUTPATIENT
Start: 2021-03-08 | End: 2021-03-29

## 2021-03-29 RX ORDER — ZOLPIDEM TARTRATE 10 MG/1
10 TABLET ORAL NIGHTLY PRN
Qty: 30 TABLET | Refills: 1 | Status: SHIPPED | OUTPATIENT
Start: 2021-03-29 | End: 2021-03-30

## 2021-03-29 RX ORDER — ESCITALOPRAM OXALATE 20 MG/1
20 TABLET ORAL DAILY
Qty: 90 TABLET | Refills: 0 | Status: SHIPPED | OUTPATIENT
Start: 2021-03-29 | End: 2021-07-01

## 2021-03-29 NOTE — TELEPHONE ENCOUNTER
Future appt:     Last Appointment with provider:   2/17/2021; Return in about 3 months (around 5/17/2021) for follow up, physical.    Last appointment at [facility] [place]:  2/17/2021  No results found for: CHOLEST, HDL, LDL, TRIGLY, TRIG  Lab Results   Compone

## 2021-03-31 RX ORDER — ZOLPIDEM TARTRATE 10 MG/1
10 TABLET ORAL NIGHTLY PRN
Qty: 30 TABLET | Refills: 1 | Status: SHIPPED | OUTPATIENT
Start: 2021-03-31 | End: 2022-01-21

## 2021-03-31 NOTE — TELEPHONE ENCOUNTER
Future appt:     Last Appointment with provider:   2/17/2021- advised Return in about 3 months (around 5/17/2021)  Last refill: 3/29/21- Zolpidem       Last appointment at [facility]:  2/17/2021  No results found for: CHOLEST, HDL, LDL, TRIGLY, TRIG  Lab

## 2021-05-29 ENCOUNTER — LAB ENCOUNTER (OUTPATIENT)
Dept: LAB | Age: 31
End: 2021-05-29
Attending: FAMILY MEDICINE
Payer: COMMERCIAL

## 2021-05-29 ENCOUNTER — OFFICE VISIT (OUTPATIENT)
Dept: FAMILY MEDICINE CLINIC | Facility: CLINIC | Age: 31
End: 2021-05-29

## 2021-05-29 VITALS
BODY MASS INDEX: 50.02 KG/M2 | SYSTOLIC BLOOD PRESSURE: 114 MMHG | OXYGEN SATURATION: 99 % | WEIGHT: 293 LBS | TEMPERATURE: 98 F | RESPIRATION RATE: 16 BRPM | HEIGHT: 64 IN | HEART RATE: 90 BPM | DIASTOLIC BLOOD PRESSURE: 68 MMHG

## 2021-05-29 DIAGNOSIS — R51.9 HEADACHE, UNSPECIFIED HEADACHE TYPE: ICD-10-CM

## 2021-05-29 DIAGNOSIS — H93.13 TINNITUS OF BOTH EARS: ICD-10-CM

## 2021-05-29 DIAGNOSIS — G43.909 MIGRAINE WITHOUT STATUS MIGRAINOSUS, NOT INTRACTABLE, UNSPECIFIED MIGRAINE TYPE: Primary | ICD-10-CM

## 2021-05-29 PROCEDURE — 83550 IRON BINDING TEST: CPT | Performed by: FAMILY MEDICINE

## 2021-05-29 PROCEDURE — 80053 COMPREHEN METABOLIC PANEL: CPT | Performed by: FAMILY MEDICINE

## 2021-05-29 PROCEDURE — 3008F BODY MASS INDEX DOCD: CPT | Performed by: FAMILY MEDICINE

## 2021-05-29 PROCEDURE — 3078F DIAST BP <80 MM HG: CPT | Performed by: FAMILY MEDICINE

## 2021-05-29 PROCEDURE — 82306 VITAMIN D 25 HYDROXY: CPT | Performed by: FAMILY MEDICINE

## 2021-05-29 PROCEDURE — 99214 OFFICE O/P EST MOD 30 MIN: CPT | Performed by: FAMILY MEDICINE

## 2021-05-29 PROCEDURE — 83540 ASSAY OF IRON: CPT | Performed by: FAMILY MEDICINE

## 2021-05-29 PROCEDURE — 82728 ASSAY OF FERRITIN: CPT | Performed by: FAMILY MEDICINE

## 2021-05-29 PROCEDURE — 36415 COLL VENOUS BLD VENIPUNCTURE: CPT | Performed by: FAMILY MEDICINE

## 2021-05-29 PROCEDURE — 85025 COMPLETE CBC W/AUTO DIFF WBC: CPT | Performed by: FAMILY MEDICINE

## 2021-05-29 PROCEDURE — 3074F SYST BP LT 130 MM HG: CPT | Performed by: FAMILY MEDICINE

## 2021-05-29 RX ORDER — SUMATRIPTAN 25 MG/1
25 TABLET, FILM COATED ORAL EVERY 2 HOUR PRN
Qty: 15 TABLET | Refills: 1 | Status: SHIPPED | OUTPATIENT
Start: 2021-05-29

## 2021-05-29 NOTE — PROGRESS NOTES
[facility] [place]  PROGRESS NOTE  Chief Complaint:   Patient presents with:  Physical: chronic headaches/migraines started 3 wks ago no known injury      HPI:   This is a 33year old female presents to clinic for evaluation of headaches and rosetta Medication Sig Dispense Refill   • SUMAtriptan Succinate (IMITREX) 25 MG Oral Tab Take 1 tablet (25 mg total) by mouth every 2 (two) hours as needed for Migraine.  Use at onset; repeat once after 2 HRS-ONLY 2 IN 24 HR MAX 15 tablet 1   • Zolpidem Tartrate acute distress. EYES:  Sclera anicteric, conjunctiva normal, PERRLA, EOMI. NECK: Supple, no carotid bruit, no JVD, no thyromegaly. LUNGS: Clear to auscultation bilterally, no rales/rhonchi/wheezing.   HEART:  Regular rate and rhythm, S1 and S2 are karlos Instructions   Start imitrex as needed   Recommend neck stretching exercise. Avoid prolonged screen time. Plenty of fluids. Check labs today. Return to clinic if no improvement in symptoms.          Health Maintenance:  Annual Physical Never done  C

## 2021-05-29 NOTE — PATIENT INSTRUCTIONS
Start imitrex as needed   Recommend neck stretching exercise. Avoid prolonged screen time. Plenty of fluids. Check labs today. Return to clinic if no improvement in symptoms.

## 2021-06-02 RX ORDER — ALPRAZOLAM 0.25 MG/1
TABLET ORAL
Qty: 30 TABLET | Refills: 0 | Status: SHIPPED | OUTPATIENT
Start: 2021-06-02

## 2021-06-02 NOTE — TELEPHONE ENCOUNTER
Future appt:     Last Appointment with provider:   5/29/2021- advised Return in about 3 months (around 8/29/2021), or if symptoms worsen or fail to improve, for physical.  Last refill: 2/17/21- alprazolam 0.25 mg      Last appointment at [facility] [place]:  5/2

## 2021-07-01 RX ORDER — ESCITALOPRAM OXALATE 20 MG/1
TABLET ORAL
Qty: 90 TABLET | Refills: 0 | Status: SHIPPED | OUTPATIENT
Start: 2021-07-01 | End: 2021-09-28

## 2021-07-01 NOTE — TELEPHONE ENCOUNTER
Future appt:     Last Appointment with provider:   5/29/2021- advised Return in about 3 months (around 8/29/2021), or if symptoms worsen or fail to improve, for physical.    Last refill: 3/29/21- Escitalopram 20 mg       No results found for: CHOLEST, HDL,

## 2021-08-15 NOTE — PROGRESS NOTES
HISTORY OF PRESENT ILLNESS  Patient presents with:  Weight Problem: Patient referred by Dr Demian Treviño is a 33year old female new to our office today for initiation of medical weight loss program.  Patient presents today with c/o excess kerry negative  Thyroid disease: negative  Constipation: negative  Other pertinent hx: occasional heartburn  Sleep Apnea hx: negative  Cancer hx: negative  Family or personal history of Pancreatic issues / Medullary Thyroid Cancer: negative  History of bariatric 88.9 05/29/2021    MCH 28.7 05/29/2021    MCHC 32.3 05/29/2021    RDW 13.2 05/29/2021    .0 05/29/2021     Lab Results   Component Value Date    GLU 82 05/29/2021    BUN 10 05/29/2021    BUNCREA 16.7 05/29/2021    CREATSERUM 0.60 05/29/2021    ANION COMPLETE  -     Cancel: TSH+FREE T4; Future  -     Cancel: HEMOGLOBIN A1C; Future  -     Cancel: LIPID PANEL;  Future  -     Cancel: VITAMIN B12; Future  -     Cancel: OP REFERRAL TO DI[facility]ITIAN [facility] ([facility] USE ONLY)  -     ELECTROCARDIOGRAM, COMPLETE  - Topamax good option to help reduce/eliminate soda intake  Comments:  soda  Orders:  -     topiramate 25 MG Oral Tab; Take 1 tablet (25 mg total) by mouth 2 (two) times daily. Refer to discharge instructions for dosing.         PLAN  · Medication use and oly peanut butter. Daily protein recommendation to start: 70 grams.   3.  Reduce refined carbohydrates which includes sugar items such as sweets as well as rice, pasta, potatoes and bread and make sure to choose whole grain options when having them with just 1 in your every day. Check out www.yourweightmatters. org blog for continued daily support and education along this weight loss journey! Return in about 1 month (around 9/16/2021) for weight management in clinic.     Patient verbalizes unders

## 2021-08-16 ENCOUNTER — OFFICE VISIT (OUTPATIENT)
Dept: INTERNAL MEDICINE CLINIC | Facility: CLINIC | Age: 31
End: 2021-08-16

## 2021-08-16 VITALS
DIASTOLIC BLOOD PRESSURE: 72 MMHG | WEIGHT: 293 LBS | RESPIRATION RATE: 14 BRPM | SYSTOLIC BLOOD PRESSURE: 120 MMHG | HEART RATE: 80 BPM | BODY MASS INDEX: 50.02 KG/M2 | HEIGHT: 64 IN

## 2021-08-16 DIAGNOSIS — R63.8 CRAVING FOR PARTICULAR FOOD: ICD-10-CM

## 2021-08-16 DIAGNOSIS — E66.01 MORBID OBESITY WITH BMI OF 50.0-59.9, ADULT (HCC): ICD-10-CM

## 2021-08-16 DIAGNOSIS — F32.A ANXIETY AND DEPRESSION: ICD-10-CM

## 2021-08-16 DIAGNOSIS — G43.009 MIGRAINE WITHOUT AURA AND WITHOUT STATUS MIGRAINOSUS, NOT INTRACTABLE: ICD-10-CM

## 2021-08-16 DIAGNOSIS — Z51.81 ENCOUNTER FOR THERAPEUTIC DRUG MONITORING: Primary | ICD-10-CM

## 2021-08-16 DIAGNOSIS — E55.9 VITAMIN D DEFICIENCY: ICD-10-CM

## 2021-08-16 DIAGNOSIS — F41.9 ANXIETY AND DEPRESSION: ICD-10-CM

## 2021-08-16 DIAGNOSIS — Z51.81 ENCOUNTER FOR THERAPEUTIC DRUG MONITORING: ICD-10-CM

## 2021-08-16 PROCEDURE — 93000 ELECTROCARDIOGRAM COMPLETE: CPT | Performed by: NURSE PRACTITIONER

## 2021-08-16 PROCEDURE — 3078F DIAST BP <80 MM HG: CPT | Performed by: NURSE PRACTITIONER

## 2021-08-16 PROCEDURE — 3074F SYST BP LT 130 MM HG: CPT | Performed by: NURSE PRACTITIONER

## 2021-08-16 PROCEDURE — 3008F BODY MASS INDEX DOCD: CPT | Performed by: NURSE PRACTITIONER

## 2021-08-16 PROCEDURE — 99214 OFFICE O/P EST MOD 30 MIN: CPT | Performed by: NURSE PRACTITIONER

## 2021-08-16 RX ORDER — TOPIRAMATE 25 MG/1
25 TABLET ORAL 2 TIMES DAILY
Qty: 60 TABLET | Refills: 1 | Status: SHIPPED | OUTPATIENT
Start: 2021-08-16 | End: 2021-10-19

## 2021-08-16 RX ORDER — TOPIRAMATE 25 MG/1
TABLET ORAL
Qty: 180 TABLET | Refills: 0 | OUTPATIENT
Start: 2021-08-16

## 2021-08-16 RX ORDER — PHENTERMINE HYDROCHLORIDE 15 MG/1
15 CAPSULE ORAL EVERY MORNING
Qty: 30 CAPSULE | Refills: 1 | Status: SHIPPED | OUTPATIENT
Start: 2021-08-16 | End: 2021-10-19

## 2021-08-16 NOTE — PATIENT INSTRUCTIONS
Welcome to the [facility]...your Lifestyle Renovation begins now! Thank you for placing your trust in our health care team, I look forward to working with you along this journey to better health!     Next steps:     1.  Sched download laury MyFitness Pal or Martha Winslow! to monitor daily dietary intake and you will be able to see if you are eating the right amount of calories or too much or too little which would hinder weight loss.  Additionally this will help to see your daily carbohy

## 2021-08-16 NOTE — TELEPHONE ENCOUNTER
Denied 90 day request for topiramate sent by pharmacy today since it is NOT required for insurance to cover it. I asked pharmacy to leave as written today.

## 2021-09-13 ENCOUNTER — OFFICE VISIT (OUTPATIENT)
Dept: INTERNAL MEDICINE CLINIC | Facility: CLINIC | Age: 31
End: 2021-09-13

## 2021-09-13 VITALS
RESPIRATION RATE: 18 BRPM | HEIGHT: 64 IN | BODY MASS INDEX: 49.85 KG/M2 | WEIGHT: 292 LBS | HEART RATE: 70 BPM | DIASTOLIC BLOOD PRESSURE: 78 MMHG | SYSTOLIC BLOOD PRESSURE: 118 MMHG | OXYGEN SATURATION: 98 %

## 2021-09-13 DIAGNOSIS — Z51.81 ENCOUNTER FOR THERAPEUTIC DRUG MONITORING: Primary | ICD-10-CM

## 2021-09-13 DIAGNOSIS — E66.01 MORBID OBESITY WITH BMI OF 50.0-59.9, ADULT (HCC): ICD-10-CM

## 2021-09-13 DIAGNOSIS — R63.8 CRAVING FOR PARTICULAR FOOD: ICD-10-CM

## 2021-09-13 DIAGNOSIS — G43.009 MIGRAINE WITHOUT AURA AND WITHOUT STATUS MIGRAINOSUS, NOT INTRACTABLE: ICD-10-CM

## 2021-09-13 PROCEDURE — 3074F SYST BP LT 130 MM HG: CPT | Performed by: NURSE PRACTITIONER

## 2021-09-13 PROCEDURE — 99213 OFFICE O/P EST LOW 20 MIN: CPT | Performed by: NURSE PRACTITIONER

## 2021-09-13 PROCEDURE — 3078F DIAST BP <80 MM HG: CPT | Performed by: NURSE PRACTITIONER

## 2021-09-13 PROCEDURE — 3008F BODY MASS INDEX DOCD: CPT | Performed by: NURSE PRACTITIONER

## 2021-09-13 NOTE — PATIENT INSTRUCTIONS
Continue making lifestyle changes that focus on good nutrition, regular exercise and stress management. Medication Plan: Continue current medication regimen. Can add over the counter Colace to help with constipation.     Next steps to work on before next membrane  • Nucleus  • Mitochondria  When cells join together, they make tissue that brings life to your bones, brain, skin, nerves, muscles, etc. The health and lifespan of your cells depend on the nutrients you get from food.  That is why healthy food cho fitness classes targeted at weight loss- [phone] and/or email @ Viridiana Garcia. Tracy@First [email or web address] org  · 360FIT Jaylon @ [email or web address]-[email or web address] Group Fitness [phone] and/or email Josiah Tinoco at [email or web address]    Online Fitness  · Fitness New Science of Food, Hormones, and Health by Dr. Nasrin Linton  · The Complete Guide to fasting by Dr. Judy Young  · Sugar, Salt & Fat by Brianna Storey, Ph.D, R.D.  · Weight Loss Surgery Will Not Treat Food Addiction by Jeremy Calvo Ph.D  · The Game Changers-

## 2021-09-13 NOTE — PROGRESS NOTES
Cassy Ndiaye is a 33year old female presents today for 1 month follow-up on medical weight loss program for the treatment of overweight, obesity, or morbid obesity.     S:  Current weight Wt Readings from Last 6 Encounters:  09/13/21 : 292 lb (132.5 kg) pink, sclera non icteric, PERRLA  LUNGS: CTA in all fields, breathing non labored  CARDIO: RRR without murmur, normal S1 and S2 without clicks or gallops, no pedal edema.   GI: +BS  NEURO/MS: motor and sensory grossly intact  PSYCH: pleasant, cooperative, n Learning to See Food as Fuel  October 8, 2019 • Posted in Dylan Schmitt, Nutrition • By Your Weight Matters Campaign    “Dieting” can start to feel challenging when we begin to associate healthy behaviors with “punishment.” Too often, we overlook the real reason we they are designed to do, problems like inflammation, cancer and other difficult health conditions start to arise.   Foods that Support Healthy Cells:  • Unsaturated Fats – Fish, nuts avocados, olive oil, flax seed, etc.  • Protein – Poultry, lean beef, yogu Tools  · LoseIT! And My Fitness Pal apps and on line for tracking nutrition  · NOOM - virtual health coaching  · FitFoundation (healthy meals on the go) in [employer] @ www. [employer]  · Jaylin ORTIZ @ www.bistromd.com and Qcrqrm68 (keto and low carb Sofiya Álvarez talk on NeuroDerm, The Call to Courage  · Podcasts: The Brittany Cease by Dr. Minerva Jin, The Exam Room by the Physician's Committee, Nutrition Facts by Dr. Rajesh Griffiths        Medication use and SEs reviewed with patient.     Return in about 4 weeks (wanda

## 2021-09-28 RX ORDER — ESCITALOPRAM OXALATE 20 MG/1
TABLET ORAL
Qty: 30 TABLET | Refills: 0 | Status: SHIPPED | OUTPATIENT
Start: 2021-09-28 | End: 2021-10-25

## 2021-09-28 NOTE — TELEPHONE ENCOUNTER
MyChart Message sent---  Overdue Visit. Escitalopram: 7/1/21     Return in about 3 months (around 8/29/2021), or if symptoms worsen or fail to improve, for physical.    Future appt:     Your appointments     Date & Time Appointment Department [facility])

## 2021-10-07 PROBLEM — M54.10 RADICULAR PAIN OF RIGHT LOWER EXTREMITY: Status: ACTIVE | Noted: 2019-09-04

## 2021-10-07 PROBLEM — M51.26 HERNIATED LUMBAR INTERVERTEBRAL DISC: Status: ACTIVE | Noted: 2019-09-04

## 2021-10-07 PROBLEM — M54.41 ACUTE RIGHT-SIDED LOW BACK PAIN WITH RIGHT-SIDED SCIATICA: Status: ACTIVE | Noted: 2019-10-24

## 2021-10-07 NOTE — PATIENT INSTRUCTIONS
Continue current medications  Depression and anxiety stable. Good job with weight loss. Recommend flu shot. Schedule fasting labs.

## 2021-10-16 DIAGNOSIS — Z51.81 ENCOUNTER FOR THERAPEUTIC DRUG MONITORING: ICD-10-CM

## 2021-10-16 DIAGNOSIS — G43.009 MIGRAINE WITHOUT AURA AND WITHOUT STATUS MIGRAINOSUS, NOT INTRACTABLE: ICD-10-CM

## 2021-10-16 DIAGNOSIS — R63.8 CRAVING FOR PARTICULAR FOOD: ICD-10-CM

## 2021-10-16 DIAGNOSIS — E66.01 MORBID OBESITY WITH BMI OF 50.0-59.9, ADULT (HCC): ICD-10-CM

## 2021-10-19 ENCOUNTER — OFFICE VISIT (OUTPATIENT)
Dept: INTERNAL MEDICINE CLINIC | Facility: CLINIC | Age: 31
End: 2021-10-19

## 2021-10-19 VITALS
DIASTOLIC BLOOD PRESSURE: 78 MMHG | SYSTOLIC BLOOD PRESSURE: 110 MMHG | WEIGHT: 282.38 LBS | HEIGHT: 64 IN | HEART RATE: 64 BPM | BODY MASS INDEX: 48.21 KG/M2

## 2021-10-19 DIAGNOSIS — R63.8 CRAVING FOR PARTICULAR FOOD: ICD-10-CM

## 2021-10-19 DIAGNOSIS — Z51.81 ENCOUNTER FOR THERAPEUTIC DRUG MONITORING: Primary | ICD-10-CM

## 2021-10-19 DIAGNOSIS — G43.009 MIGRAINE WITHOUT AURA AND WITHOUT STATUS MIGRAINOSUS, NOT INTRACTABLE: ICD-10-CM

## 2021-10-19 DIAGNOSIS — E66.01 MORBID OBESITY WITH BMI OF 50.0-59.9, ADULT (HCC): ICD-10-CM

## 2021-10-19 DIAGNOSIS — Z51.81 ENCOUNTER FOR THERAPEUTIC DRUG MONITORING: ICD-10-CM

## 2021-10-19 PROCEDURE — 3008F BODY MASS INDEX DOCD: CPT | Performed by: NURSE PRACTITIONER

## 2021-10-19 PROCEDURE — 3078F DIAST BP <80 MM HG: CPT | Performed by: NURSE PRACTITIONER

## 2021-10-19 PROCEDURE — 3074F SYST BP LT 130 MM HG: CPT | Performed by: NURSE PRACTITIONER

## 2021-10-19 PROCEDURE — 99213 OFFICE O/P EST LOW 20 MIN: CPT | Performed by: NURSE PRACTITIONER

## 2021-10-19 RX ORDER — PHENTERMINE HYDROCHLORIDE 15 MG/1
15 CAPSULE ORAL EVERY MORNING
Qty: 30 CAPSULE | Refills: 1 | Status: SHIPPED | OUTPATIENT
Start: 2021-10-19 | End: 2021-10-28 | Stop reason: DRUGHIGH

## 2021-10-19 RX ORDER — TOPIRAMATE 25 MG/1
TABLET ORAL
Qty: 60 TABLET | Refills: 1 | OUTPATIENT
Start: 2021-10-19

## 2021-10-19 RX ORDER — TOPIRAMATE 25 MG/1
50 TABLET ORAL DAILY
Qty: 60 TABLET | Refills: 1 | Status: SHIPPED | OUTPATIENT
Start: 2021-10-19 | End: 2021-12-09

## 2021-10-19 RX ORDER — TOPIRAMATE 25 MG/1
TABLET ORAL
Qty: 180 TABLET | Refills: 0 | OUTPATIENT
Start: 2021-10-19

## 2021-10-19 NOTE — PATIENT INSTRUCTIONS
Continue making lifestyle changes that focus on good nutrition, regular exercise and stress management. Medication Plan: Continue current medication regimen. Add over the counter Miralax daily for constipation.     Next steps to work on before next [employer] zucchini to spaghetti and meatballs. You won't even notice! Have a better idea? Write it here:     ________________________________________________________  Date Last Reviewed: 10/1/2017  © 6655-2102 The Inocencio 4037.  Alter Wall [address] Johan Lowe low carb plans recommended) @ www. YSFZRU86.SIO, Metabolic Meals @ www. Terresolve TechnologiesMeConnexient. com - individual prepared meals to go  · FilaExpress, [employer], Every Plate, Insync Systemset- on line meal delivery programs for preparation at home  · [employer] in Courage  · Podcasts:  The Darrol Martini by Dr. Ken Parkinson, The Exam Room by the Physician's Committee, Nutrition Facts by Dr. Daniel Antonio

## 2021-10-19 NOTE — PROGRESS NOTES
Kingsley Mendoza is a 33year old female presents today for 2 month follow-up on medical weight loss program for the treatment of overweight, obesity, or morbid obesity.     S:  Current weight Wt Readings from Last 6 Encounters:  10/19/21 : 282 lb 6.4 oz (128 labored  CARDIO: RRR without murmur, normal S1 and S2 without clicks or gallops, no pedal edema.   GI: +BS  NEURO/MS: motor and sensory grossly intact  PSYCH: pleasant, cooperative, normal mood and affect    ASSESSMENT AND PLAN:  Reviewed Initial Weight Emre packed with vitamins needed for health and growth. At mealtimes, make half your plate fruits and vegetables. Nutrient-rich choices  Fresh, frozen, or canned—all vegetables are high in nutrients. The color of the skin tells you what’s inside.  So if you eat Center @ http://www.mitchell-reyes.christopher/ Full fitness center with group fitness and personal training. Discount available as client of [facility].   · Health Coaching and Personal Training with Ean Gaona at our laury  · Www.yourweightmatters. org - Obesity Action Coalition sponsored Blog posts daily  · Motivation laury (black box with white \")- daily supportive messages sent to your phone    Books/Video Education/Podcasts  · Mindless Eating by Anel Lawrence  · Atomic

## 2021-10-25 RX ORDER — ESCITALOPRAM OXALATE 20 MG/1
TABLET ORAL
Qty: 90 TABLET | Refills: 1 | Status: SHIPPED | OUTPATIENT
Start: 2021-10-25

## 2021-10-25 NOTE — TELEPHONE ENCOUNTER
Escitalopram: 9/28/21     Return in about 6 months (around 4/7/2022) for physical.    Future appt:     Your appointments     Date & Time Appointment Department [facility])    Nov 30, 2021 10:40 AM CST Follow Up Visit with Barbara Mccormick [address] [place] [address]

## 2021-10-27 ENCOUNTER — PATIENT MESSAGE (OUTPATIENT)
Dept: INTERNAL MEDICINE CLINIC | Facility: CLINIC | Age: 31
End: 2021-10-27

## 2021-10-27 DIAGNOSIS — Z51.81 ENCOUNTER FOR THERAPEUTIC DRUG MONITORING: Primary | ICD-10-CM

## 2021-10-27 DIAGNOSIS — E66.01 MORBID OBESITY WITH BMI OF 50.0-59.9, ADULT (HCC): ICD-10-CM

## 2021-10-27 NOTE — TELEPHONE ENCOUNTER
From: Nacho De León  To: IMTIAZ Horta  Sent: 10/27/2021 10:42 AM CDT  Subject: Always hungry     Good morning!!  I have noticed lately that I always seem to be hungry. I haven't changed my diet since I say you on the 19th.  Even when I eat more an

## 2021-10-27 NOTE — TELEPHONE ENCOUNTER
We can increase her phentermine to 37.5 mg, sig: take 1 tab daily in the AM #30. Have her finish up her 15 mg tabs first by taking 2 (30 mg) daily.  Please let her know constipation may increase with this adjustment to continue with miralax as discussed at

## 2021-10-28 RX ORDER — PHENTERMINE HYDROCHLORIDE 37.5 MG/1
37.5 TABLET ORAL EVERY MORNING
Qty: 30 TABLET | Refills: 0 | Status: SHIPPED | OUTPATIENT
Start: 2021-10-28 | End: 2021-12-05

## 2021-12-03 DIAGNOSIS — Z51.81 ENCOUNTER FOR THERAPEUTIC DRUG MONITORING: ICD-10-CM

## 2021-12-03 DIAGNOSIS — E66.01 MORBID OBESITY WITH BMI OF 50.0-59.9, ADULT (HCC): ICD-10-CM

## 2021-12-03 NOTE — TELEPHONE ENCOUNTER
Requesting   Requested Prescriptions     Pending Prescriptions Disp Refills   • PHENTERMINE HCL 37.5 MG Oral Tab [Pharmacy Med Name: PHENTERMINE 37.5MG TABLET] 30 tablet 0     Sig: TAKE 1 TABLET(37.5 MG) BY MOUTH EVERY MORNING     LOV: 10/19/21  RTC: Julianna stephens

## 2021-12-05 RX ORDER — PHENTERMINE HYDROCHLORIDE 37.5 MG/1
TABLET ORAL
Qty: 30 TABLET | Refills: 0 | Status: SHIPPED | OUTPATIENT
Start: 2021-12-05 | End: 2021-12-09

## 2021-12-09 ENCOUNTER — OFFICE VISIT (OUTPATIENT)
Dept: INTERNAL MEDICINE CLINIC | Facility: CLINIC | Age: 31
End: 2021-12-09

## 2021-12-09 VITALS
RESPIRATION RATE: 16 BRPM | WEIGHT: 269 LBS | HEART RATE: 74 BPM | HEIGHT: 64 IN | DIASTOLIC BLOOD PRESSURE: 80 MMHG | BODY MASS INDEX: 45.93 KG/M2 | SYSTOLIC BLOOD PRESSURE: 120 MMHG

## 2021-12-09 DIAGNOSIS — Z51.81 ENCOUNTER FOR THERAPEUTIC DRUG MONITORING: Primary | ICD-10-CM

## 2021-12-09 DIAGNOSIS — E66.01 MORBID OBESITY WITH BMI OF 50.0-59.9, ADULT (HCC): ICD-10-CM

## 2021-12-09 DIAGNOSIS — G43.009 MIGRAINE WITHOUT AURA AND WITHOUT STATUS MIGRAINOSUS, NOT INTRACTABLE: ICD-10-CM

## 2021-12-09 DIAGNOSIS — R63.8 CRAVING FOR PARTICULAR FOOD: ICD-10-CM

## 2021-12-09 PROCEDURE — 3074F SYST BP LT 130 MM HG: CPT | Performed by: NURSE PRACTITIONER

## 2021-12-09 PROCEDURE — 99213 OFFICE O/P EST LOW 20 MIN: CPT | Performed by: NURSE PRACTITIONER

## 2021-12-09 PROCEDURE — 3079F DIAST BP 80-89 MM HG: CPT | Performed by: NURSE PRACTITIONER

## 2021-12-09 PROCEDURE — 3008F BODY MASS INDEX DOCD: CPT | Performed by: NURSE PRACTITIONER

## 2021-12-09 RX ORDER — TOPIRAMATE 50 MG/1
50 TABLET, FILM COATED ORAL DAILY
Qty: 60 TABLET | Refills: 2 | Status: SHIPPED | OUTPATIENT
Start: 2021-12-09 | End: 2022-01-26

## 2021-12-09 RX ORDER — PHENTERMINE HYDROCHLORIDE 37.5 MG/1
37.5 TABLET ORAL EVERY MORNING
Qty: 30 TABLET | Refills: 1 | Status: SHIPPED | OUTPATIENT
Start: 2021-12-09 | End: 2022-01-26

## 2021-12-09 NOTE — PROGRESS NOTES
Cassy Ndiaye is a 33year old female presents today for 4 month follow-up on medical weight loss program for the treatment of overweight, obesity, or morbid obesity.     S:  Current weight Wt Readings from Last 6 Encounters:  12/09/21 : 269 lb (122 kg)  1 Stress    Average stress level on a scale of 1-10, with 10 being extremely stressed: 7   If greater than 5/1O how would you grade your coping mechanisms?  moderate   Sleep hours and integrity    How many hours of uninterrupted sleep do you get a night: 4 Tab 30 tablet 1     Sig: Take 1 tablet (37.5 mg total) by mouth every morning. • topiramate 50 MG Oral Tab 60 tablet 2     Sig: Take 1 tablet (50 mg total) by mouth daily.        Imaging & Consults:  None      Plan:  Patient has lost 13# since LOV 2 month weight.”  Challenging Your [employer]  Your inner critic isn't merely a personal motivator or an internal system of checks and balances. If left unchecked, the voice of your inner critic can prevent you from believing in yourself and reaching your goals. your aptitude or self-worth (i.e. a busy schedule, health limitations or stress). Final Takeaway  Remember that your thoughts have powerful influence over your actions.  When you make an effort to think positively and silence your inner critic, you will sl for Food at [employer] a goal for the holidays. If you’re currently trying to lose weight, you might prefer putting this goal on hold for a short period. In this case, reset your goal and strive to maintain your weight instead.  No matter what you tr who do. Not everyone has the same mindset about health. They may take it personally, but you don’t have to. #3 Work on Your Mindset  Think about your relationship with food during the holidays. Why do we focus so much on it?  Happiness comes from health

## 2021-12-09 NOTE — PATIENT INSTRUCTIONS
Continue making lifestyle changes that focus on good nutrition, regular exercise and stress management. Medication Plan: Continue current medication regimen with option to take Topamax twice a day.  Contact if weight plateau after the holidays than expan Compliment and Encourage Others as a Habit.  The more you make a habit out of lifting others up, the more likely you are to lift yourself up along the way, too.  3 – Start a Positive Affirmation Journal. Every day, write down positive things you recognize a and extra shopping, but because we struggle with food. When holiday celebrations have a bunch of rich and tasty dishes, they can pose quite a challenge to our mental health. You know how it goes.  You sit down to a plate that’s overflowing with delicious for celebrations:    Bring your own food or eat before going out. Take tiny portions from each dish. Take a break at any event to regroup. Go to the bathroom or pop outside for a moment. Remind yourself that you have a goal you want to stick with.   Eat s in handling the holidays. Overeating comes from mindless eating. Allow yourself small to moderate amounts of foods you love while enjoying every bite. Eat slowly and focus on flavors and textures. Be present and appreciate each moment.  With practice, mindf

## 2022-01-11 ENCOUNTER — TELEMEDICINE (OUTPATIENT)
Dept: INTERNAL MEDICINE CLINIC | Facility: CLINIC | Age: 32
End: 2022-01-11

## 2022-01-11 DIAGNOSIS — E66.01 MORBID OBESITY WITH BMI OF 50.0-59.9, ADULT (HCC): ICD-10-CM

## 2022-01-11 DIAGNOSIS — Z51.81 ENCOUNTER FOR THERAPEUTIC DRUG MONITORING: Primary | ICD-10-CM

## 2022-01-11 PROCEDURE — 99213 OFFICE O/P EST LOW 20 MIN: CPT | Performed by: NURSE PRACTITIONER

## 2022-01-11 NOTE — PROGRESS NOTES
[facility] follow up via video visit:    Subjective    This visit is conducted using Telemedicine with live, interactive video and audio.     Chief Complaint:  Routine follow up visit for lifestyle and medical management for overweigh plateau hits. No refills needed at this time. -  on reset and refresh        Patient Instructions   Continue making lifestyle changes that focus on good nutrition, regular exercise and stress management.     Medication Plan: Continue current medicat water and stop drinking soda. I will ask a friend to walk with me once per week. I will spend 10 minutes doing meditation daily. Make goals that are measurable.   If you choose goals that are vague, it will be difficult to determine if you have achieved create a healthy food environment in your home? Every day, decisions we make about food are influenced by hundreds of factors, many of them subconscious. For example, an open box of cookies on the counter is more tempting than an apple in your crisper.   Delio Lowery without distractions is a way to eat more mindfully and enjoy time with your family. Eating on the couch while watching shows or movies can encourage mindless eating and consuming more calories than intended.   6 – Keep Healthy Foods Readily Available  When

## 2022-01-11 NOTE — PATIENT INSTRUCTIONS
Continue making lifestyle changes that focus on good nutrition, regular exercise and stress management.     Medication Plan: Continue current medication regimen with option to take Topamax twice a day or 2 tabs daily should weigh plateau hits for > than 2 w meditation daily. Make goals that are measurable. If you choose goals that are vague, it will be difficult to determine if you have achieved those goals. Find a way to ensure these goals are met. This will keep you accountable and on target.  Here are dimitri factors, many of them subconscious. For example, an open box of cookies on the counter is more tempting than an apple in your crisper.   Having a healthy food environment at home will make it easier to choose healthy options and stay on track with habits th watching shows or movies can encourage mindless eating and consuming more calories than intended.   6 – Keep Healthy Foods Readily Available  When you are hungry and in a pinch, it's important to have healthy foods on-hand so you don't make hunger-controlle

## 2022-01-21 RX ORDER — ZOLPIDEM TARTRATE 10 MG/1
TABLET ORAL
Qty: 30 TABLET | Refills: 0 | Status: SHIPPED | OUTPATIENT
Start: 2022-01-21

## 2022-01-21 NOTE — TELEPHONE ENCOUNTER
Future appt:     Your appointments     Date & Time Appointment Department [facility])    Mar 17, 2022  8:40 AM CDT Follow up Visit with Jillian Bui, [address] , [facility], [address] ([facility])    Vee schrader

## 2022-01-26 DIAGNOSIS — Z51.81 ENCOUNTER FOR THERAPEUTIC DRUG MONITORING: ICD-10-CM

## 2022-01-26 DIAGNOSIS — R63.8 CRAVING FOR PARTICULAR FOOD: ICD-10-CM

## 2022-01-26 DIAGNOSIS — G43.009 MIGRAINE WITHOUT AURA AND WITHOUT STATUS MIGRAINOSUS, NOT INTRACTABLE: ICD-10-CM

## 2022-01-26 DIAGNOSIS — E66.01 MORBID OBESITY WITH BMI OF 50.0-59.9, ADULT (HCC): ICD-10-CM

## 2022-01-26 NOTE — TELEPHONE ENCOUNTER
Requesting   Requested Prescriptions     Pending Prescriptions Disp Refills   • Phentermine HCl 37.5 MG Oral Tab 30 tablet 1     Sig: Take 1 tablet (37.5 mg total) by mouth every morning.    • topiramate 50 MG Oral Tab 60 tablet 2     Sig: Take 1 tablet (50

## 2022-01-27 RX ORDER — PHENTERMINE HYDROCHLORIDE 37.5 MG/1
37.5 TABLET ORAL EVERY MORNING
Qty: 30 TABLET | Refills: 1 | Status: SHIPPED | OUTPATIENT
Start: 2022-01-27

## 2022-01-27 RX ORDER — TOPIRAMATE 50 MG/1
50 TABLET, FILM COATED ORAL DAILY
Qty: 60 TABLET | Refills: 2 | Status: SHIPPED | OUTPATIENT
Start: 2022-01-27

## 2022-02-18 RX ORDER — TOPIRAMATE 100 MG/1
TABLET, FILM COATED ORAL
Qty: 180 TABLET | Refills: 0 | OUTPATIENT
Start: 2022-02-18

## 2022-02-28 NOTE — TELEPHONE ENCOUNTER
Future appt:    Last Appointment with provider:   2/17/2021  Last appointment at [facility]:  2/17/2021-  Pt was asked to return in 3 months    Zolpidem was refilld on 2/17/21 for #30 with 0 refills      No results found for: Ariana Smith, HDL, LDL, Álvaro Graves T Rotation Flap Text: The defect edges were debeveled with a #15 scalpel blade.  Given the location of the defect, shape of the defect and the proximity to free margins a rotation flap was deemed most appropriate.  Using a sterile surgical marker, an appropriate rotation flap was drawn incorporating the defect and placing the expected incisions within the relaxed skin tension lines where possible.    The area thus outlined was incised deep to adipose tissue with a #15 scalpel blade.  The skin margins were undermined to an appropriate distance in all directions utilizing iris scissors.

## 2022-03-05 DIAGNOSIS — E66.01 MORBID OBESITY WITH BMI OF 50.0-59.9, ADULT (HCC): ICD-10-CM

## 2022-03-05 DIAGNOSIS — Z51.81 ENCOUNTER FOR THERAPEUTIC DRUG MONITORING: ICD-10-CM

## 2022-03-05 RX ORDER — PHENTERMINE HYDROCHLORIDE 37.5 MG/1
37.5 TABLET ORAL EVERY MORNING
Qty: 30 TABLET | Refills: 1 | Status: CANCELLED | OUTPATIENT
Start: 2022-03-05

## 2022-03-06 NOTE — TELEPHONE ENCOUNTER
Requesting Phentermine 37.5 mg  LOV: 1/11/22  RTC: 2 months  Last Relevant Labs: na  Filled: 1/27/22 #30 with 1 refills  Last [place] 1[place]27/22 [place]    Future Appointments   Date Time Provider Nuzhat Munoz   3/17/2022  8:40 AM IMTIAZ Cole [facility]     Pt asking for refill which is at pharmacy. My chart sent to call them.

## 2022-03-17 ENCOUNTER — OFFICE VISIT (OUTPATIENT)
Dept: INTERNAL MEDICINE CLINIC | Facility: CLINIC | Age: 32
End: 2022-03-17
Payer: COMMERCIAL

## 2022-03-17 VITALS
WEIGHT: 250 LBS | DIASTOLIC BLOOD PRESSURE: 70 MMHG | HEIGHT: 64 IN | RESPIRATION RATE: 16 BRPM | HEART RATE: 77 BPM | SYSTOLIC BLOOD PRESSURE: 120 MMHG | BODY MASS INDEX: 42.68 KG/M2

## 2022-03-17 DIAGNOSIS — G43.009 MIGRAINE WITHOUT AURA AND WITHOUT STATUS MIGRAINOSUS, NOT INTRACTABLE: ICD-10-CM

## 2022-03-17 DIAGNOSIS — E66.01 MORBID OBESITY WITH BMI OF 50.0-59.9, ADULT (HCC): ICD-10-CM

## 2022-03-17 DIAGNOSIS — Z51.81 ENCOUNTER FOR THERAPEUTIC DRUG MONITORING: Primary | ICD-10-CM

## 2022-03-17 DIAGNOSIS — R63.8 CRAVING FOR PARTICULAR FOOD: ICD-10-CM

## 2022-03-17 PROCEDURE — 99213 OFFICE O/P EST LOW 20 MIN: CPT | Performed by: NURSE PRACTITIONER

## 2022-03-17 PROCEDURE — 3078F DIAST BP <80 MM HG: CPT | Performed by: NURSE PRACTITIONER

## 2022-03-17 PROCEDURE — 3074F SYST BP LT 130 MM HG: CPT | Performed by: NURSE PRACTITIONER

## 2022-03-17 PROCEDURE — 3008F BODY MASS INDEX DOCD: CPT | Performed by: NURSE PRACTITIONER

## 2022-03-17 RX ORDER — PHENTERMINE HYDROCHLORIDE 37.5 MG/1
37.5 TABLET ORAL EVERY MORNING
Qty: 30 TABLET | Refills: 1 | Status: SHIPPED | OUTPATIENT
Start: 2022-03-17

## 2022-03-17 RX ORDER — TOPIRAMATE 100 MG/1
100 TABLET, FILM COATED ORAL 2 TIMES DAILY
Qty: 60 TABLET | Refills: 1 | Status: SHIPPED | OUTPATIENT
Start: 2022-03-17

## 2022-04-02 RX ORDER — PHENTERMINE HYDROCHLORIDE 37.5 MG/1
37.5 TABLET ORAL EVERY MORNING
Qty: 30 TABLET | Refills: 1 | OUTPATIENT
Start: 2022-04-02

## 2022-04-18 RX ORDER — ESCITALOPRAM OXALATE 20 MG/1
TABLET ORAL
Qty: 90 TABLET | Refills: 1 | OUTPATIENT
Start: 2022-04-18

## 2022-04-18 NOTE — TELEPHONE ENCOUNTER
Return in about 6 months (around 4/7/2022) for physical.    Future appt: Your appointments     Date & Time Appointment Department [facility])    May 04, 2022 11:00 AM CDT Follow Up Visit with IMTIAZ Stanley [address] [facility], Weight Loss Clinic, [address] [place] [address] ([facility])            Genevieve Sorensen, [facility] Loss [facility], [facility] [place] Ramirez  [facility] [address]  [address] [address] [phone]        Last Appointment with provider:   Visit date not found  Last appointment at [facility] [place]:  Visit date not found  No results found for: CHOLEST, HDL, LDL, TRIGLY, TRIG  Lab Results   Component Value Date     09/16/2020    A1C 5.4 09/16/2020     Lab Results   Component Value Date    TSH 2.900 09/16/2020       No follow-ups on file.

## 2022-04-19 RX ORDER — ESCITALOPRAM OXALATE 20 MG/1
20 TABLET ORAL DAILY
Qty: 90 TABLET | Refills: 0 | Status: SHIPPED | OUTPATIENT
Start: 2022-04-19

## 2022-04-19 NOTE — TELEPHONE ENCOUNTER
Future appt: Your appointments     Date & Time Appointment Department [facility])    May 04, 2022 11:00 AM CDT Follow Up Visit with IMTIAZ Mak [employer] [facility], Weight Loss Clinic, [address] [place] [address] ([facility])        May 06, 2022 10:00 AM CDT Physical - Established with Veronica Fitzgerald MD [facility])            [address] [address] Group, Weight Loss Clinic, [place]  [facility] [address]  [address] [address] [phone]         Last Appointment with provider: 10/7/21- advised Return in about 6 months (around 4/7/2022) for physical.       No results found for: CHOLEST, HDL, LDL, TRIGLY, TRIG  Lab Results   Component Value Date     09/16/2020    A1C 5.4 09/16/2020     Lab Results   Component Value Date    TSH 2.900 09/16/2020       No follow-ups on file.

## 2022-05-04 ENCOUNTER — OFFICE VISIT (OUTPATIENT)
Dept: INTERNAL MEDICINE CLINIC | Facility: CLINIC | Age: 32
End: 2022-05-04
Payer: COMMERCIAL

## 2022-05-04 VITALS
BODY MASS INDEX: 42.34 KG/M2 | HEIGHT: 64 IN | SYSTOLIC BLOOD PRESSURE: 110 MMHG | DIASTOLIC BLOOD PRESSURE: 70 MMHG | WEIGHT: 248 LBS | HEART RATE: 80 BPM | RESPIRATION RATE: 14 BRPM

## 2022-05-04 DIAGNOSIS — F32.A ANXIETY AND DEPRESSION: ICD-10-CM

## 2022-05-04 DIAGNOSIS — Z51.81 ENCOUNTER FOR THERAPEUTIC DRUG MONITORING: Primary | ICD-10-CM

## 2022-05-04 DIAGNOSIS — E66.01 MORBID OBESITY WITH BMI OF 50.0-59.9, ADULT (HCC): ICD-10-CM

## 2022-05-04 DIAGNOSIS — G43.009 MIGRAINE WITHOUT AURA AND WITHOUT STATUS MIGRAINOSUS, NOT INTRACTABLE: ICD-10-CM

## 2022-05-04 DIAGNOSIS — F41.9 ANXIETY AND DEPRESSION: ICD-10-CM

## 2022-05-04 DIAGNOSIS — R63.8 CRAVING FOR PARTICULAR FOOD: ICD-10-CM

## 2022-05-04 PROCEDURE — 99214 OFFICE O/P EST MOD 30 MIN: CPT | Performed by: NURSE PRACTITIONER

## 2022-05-04 PROCEDURE — 3074F SYST BP LT 130 MM HG: CPT | Performed by: NURSE PRACTITIONER

## 2022-05-04 PROCEDURE — 3008F BODY MASS INDEX DOCD: CPT | Performed by: NURSE PRACTITIONER

## 2022-05-04 PROCEDURE — 3078F DIAST BP <80 MM HG: CPT | Performed by: NURSE PRACTITIONER

## 2022-05-04 RX ORDER — TOPIRAMATE 100 MG/1
100 TABLET, FILM COATED ORAL 2 TIMES DAILY
Qty: 180 TABLET | Refills: 1 | Status: SHIPPED | OUTPATIENT
Start: 2022-05-04 | End: 2022-05-06

## 2022-05-04 RX ORDER — PHENTERMINE HYDROCHLORIDE 37.5 MG/1
37.5 TABLET ORAL EVERY MORNING
Qty: 30 TABLET | Refills: 1 | Status: SHIPPED | OUTPATIENT
Start: 2022-05-04

## 2022-05-04 RX ORDER — BUPROPION HYDROCHLORIDE 150 MG/1
150 TABLET ORAL EVERY MORNING
Qty: 30 TABLET | Refills: 1 | Status: SHIPPED | OUTPATIENT
Start: 2022-05-04

## 2022-05-04 RX ORDER — NALTREXONE HYDROCHLORIDE 50 MG/1
25 TABLET, FILM COATED ORAL
Qty: 15 TABLET | Refills: 1 | Status: SHIPPED | OUTPATIENT
Start: 2022-05-04

## 2022-05-06 ENCOUNTER — OFFICE VISIT (OUTPATIENT)
Dept: FAMILY MEDICINE CLINIC | Facility: CLINIC | Age: 32
End: 2022-05-06
Payer: COMMERCIAL

## 2022-05-06 ENCOUNTER — PATIENT MESSAGE (OUTPATIENT)
Dept: INTERNAL MEDICINE CLINIC | Facility: CLINIC | Age: 32
End: 2022-05-06

## 2022-05-06 VITALS
WEIGHT: 252 LBS | RESPIRATION RATE: 18 BRPM | BODY MASS INDEX: 41.99 KG/M2 | SYSTOLIC BLOOD PRESSURE: 102 MMHG | DIASTOLIC BLOOD PRESSURE: 64 MMHG | OXYGEN SATURATION: 95 % | TEMPERATURE: 98 F | HEART RATE: 76 BPM | HEIGHT: 65 IN

## 2022-05-06 DIAGNOSIS — G47.09 OTHER INSOMNIA: ICD-10-CM

## 2022-05-06 DIAGNOSIS — Z00.00 PHYSICAL EXAM: Primary | ICD-10-CM

## 2022-05-06 DIAGNOSIS — G43.009 MIGRAINE WITHOUT AURA AND WITHOUT STATUS MIGRAINOSUS, NOT INTRACTABLE: ICD-10-CM

## 2022-05-06 DIAGNOSIS — F41.1 ANXIETY STATE: ICD-10-CM

## 2022-05-06 DIAGNOSIS — F33.1 MODERATE EPISODE OF RECURRENT MAJOR DEPRESSIVE DISORDER (HCC): ICD-10-CM

## 2022-05-06 PROCEDURE — 3078F DIAST BP <80 MM HG: CPT | Performed by: FAMILY MEDICINE

## 2022-05-06 PROCEDURE — 3008F BODY MASS INDEX DOCD: CPT | Performed by: FAMILY MEDICINE

## 2022-05-06 PROCEDURE — 3074F SYST BP LT 130 MM HG: CPT | Performed by: FAMILY MEDICINE

## 2022-05-06 PROCEDURE — 99395 PREV VISIT EST AGE 18-39: CPT | Performed by: FAMILY MEDICINE

## 2022-05-06 PROCEDURE — G0438 PPPS, INITIAL VISIT: HCPCS | Performed by: FAMILY MEDICINE

## 2022-05-06 RX ORDER — ZOLPIDEM TARTRATE 10 MG/1
10 TABLET ORAL NIGHTLY PRN
Qty: 30 TABLET | Refills: 0 | COMMUNITY
Start: 2022-05-06

## 2022-05-06 RX ORDER — TRAZODONE HYDROCHLORIDE 50 MG/1
50 TABLET ORAL NIGHTLY
Qty: 30 TABLET | Refills: 2 | Status: SHIPPED | OUTPATIENT
Start: 2022-05-06 | End: 2022-05-06 | Stop reason: ALTCHOICE

## 2022-05-06 NOTE — PATIENT INSTRUCTIONS
Continue current medications  Stop Ambien. Start trazodone at night. Schedule fasting labs. Anxiety and depression stable with medication. Migraine stable. Sleep Hygine:    Develope a bedtime routine. Go to Bed the same time every day. Avoid caffiene, nicotine and alcohol after 1 pm   Do not eat heavy meals 2 hours prior to bedtime. Have Room Cool and [place]. Avoid light exposure prior to bedtime. Recommend Bright Light exposure in morning. Avoid napping during the day. Associate your bed with sleep. do not watch TV, use computer, or read in bed. Exercise regularly but avoid exercising in evening    Addendum 5/6/2022 3:45 PM: Called patient and discussed regarding use of Lexapro, bupropion and trazodone combination. There is chances of increase in serotonin syndrome with taking combination. Recommend not to start trazodone. Continue with Ambien as needed. Return to clinic if any concern.

## 2022-05-06 NOTE — TELEPHONE ENCOUNTER
From: Yimi Stroud  To: IMTIAZ Ramirez  Sent: 5/6/2022 10:47 AM CDT  Subject: Medication    Hi!! The 90 day refill for the Topirmate won't go through [place] can you just send a 30 day prescription please!!     Thank you!!

## 2022-05-07 RX ORDER — TOPIRAMATE 100 MG/1
100 TABLET, FILM COATED ORAL 2 TIMES DAILY
Qty: 60 TABLET | Refills: 2 | Status: SHIPPED | OUTPATIENT
Start: 2022-05-07

## 2022-05-20 ENCOUNTER — LABORATORY ENCOUNTER (OUTPATIENT)
Dept: LAB | Age: 32
End: 2022-05-20
Attending: FAMILY MEDICINE
Payer: COMMERCIAL

## 2022-05-20 DIAGNOSIS — F41.9 ANXIETY AND DEPRESSION: ICD-10-CM

## 2022-05-20 DIAGNOSIS — F32.A ANXIETY AND DEPRESSION: ICD-10-CM

## 2022-05-20 DIAGNOSIS — Z00.00 PHYSICAL EXAM: ICD-10-CM

## 2022-05-20 DIAGNOSIS — Z51.81 ENCOUNTER FOR THERAPEUTIC DRUG MONITORING: ICD-10-CM

## 2022-05-20 DIAGNOSIS — E66.01 MORBID OBESITY WITH BMI OF 50.0-59.9, ADULT (HCC): ICD-10-CM

## 2022-05-20 DIAGNOSIS — E55.9 VITAMIN D DEFICIENCY: ICD-10-CM

## 2022-05-20 LAB
ALBUMIN SERPL-MCNC: 3.5 G/DL (ref 3.4–5)
ALBUMIN/GLOB SERPL: 1 {RATIO} (ref 1–2)
ALP LIVER SERPL-CCNC: 61 U/L
ALT SERPL-CCNC: 25 U/L
ANION GAP SERPL CALC-SCNC: 11 MMOL/L (ref 0–18)
AST SERPL-CCNC: 14 U/L (ref 15–37)
BASOPHILS # BLD AUTO: 0.08 X10(3) UL (ref 0–0.2)
BASOPHILS NFR BLD AUTO: 0.9 %
BILIRUB SERPL-MCNC: 0.3 MG/DL (ref 0.1–2)
BUN BLD-MCNC: 10 MG/DL (ref 7–18)
CALCIUM BLD-MCNC: 8.9 MG/DL (ref 8.5–10.1)
CHLORIDE SERPL-SCNC: 113 MMOL/L (ref 98–112)
CHOLEST SERPL-MCNC: 160 MG/DL (ref ?–200)
CO2 SERPL-SCNC: 20 MMOL/L (ref 21–32)
CREAT BLD-MCNC: 0.78 MG/DL
EOSINOPHIL # BLD AUTO: 0.08 X10(3) UL (ref 0–0.7)
EOSINOPHIL NFR BLD AUTO: 0.9 %
ERYTHROCYTE [DISTWIDTH] IN BLOOD BY AUTOMATED COUNT: 13.4 %
EST. AVERAGE GLUCOSE BLD GHB EST-MCNC: 108 MG/DL (ref 68–126)
FASTING PATIENT LIPID ANSWER: YES
FASTING STATUS PATIENT QL REPORTED: YES
GLOBULIN PLAS-MCNC: 3.4 G/DL (ref 2.8–4.4)
GLUCOSE BLD-MCNC: 81 MG/DL (ref 70–99)
HBA1C MFR BLD: 5.4 % (ref ?–5.7)
HCT VFR BLD AUTO: 40.8 %
HDLC SERPL-MCNC: 53 MG/DL (ref 40–59)
HGB BLD-MCNC: 12.6 G/DL
IMM GRANULOCYTES # BLD AUTO: 0.01 X10(3) UL (ref 0–1)
IMM GRANULOCYTES NFR BLD: 0.1 %
LDLC SERPL CALC-MCNC: 98 MG/DL (ref ?–100)
LYMPHOCYTES # BLD AUTO: 2.45 X10(3) UL (ref 1–4)
LYMPHOCYTES NFR BLD AUTO: 26.6 %
MCH RBC QN AUTO: 29.2 PG (ref 26–34)
MCHC RBC AUTO-ENTMCNC: 30.9 G/DL (ref 31–37)
MCV RBC AUTO: 94.7 FL
MONOCYTES # BLD AUTO: 0.4 X10(3) UL (ref 0.1–1)
MONOCYTES NFR BLD AUTO: 4.3 %
NEUTROPHILS # BLD AUTO: 6.18 X10 (3) UL (ref 1.5–7.7)
NEUTROPHILS # BLD AUTO: 6.18 X10(3) UL (ref 1.5–7.7)
NEUTROPHILS NFR BLD AUTO: 67.2 %
NONHDLC SERPL-MCNC: 107 MG/DL (ref ?–130)
OSMOLALITY SERPL CALC.SUM OF ELEC: 296 MOSM/KG (ref 275–295)
PLATELET # BLD AUTO: 277 10(3)UL (ref 150–450)
POTASSIUM SERPL-SCNC: 3.9 MMOL/L (ref 3.5–5.1)
PROT SERPL-MCNC: 6.9 G/DL (ref 6.4–8.2)
RBC # BLD AUTO: 4.31 X10(6)UL
SODIUM SERPL-SCNC: 144 MMOL/L (ref 136–145)
TRIGL SERPL-MCNC: 40 MG/DL (ref 30–149)
TSI SER-ACNC: 1.75 MIU/ML (ref 0.36–3.74)
VIT B12 SERPL-MCNC: 806 PG/ML (ref 193–986)
VIT D+METAB SERPL-MCNC: 29.8 NG/ML (ref 30–100)
VLDLC SERPL CALC-MCNC: 7 MG/DL (ref 0–30)
WBC # BLD AUTO: 9.2 X10(3) UL (ref 4–11)

## 2022-05-20 PROCEDURE — 80061 LIPID PANEL: CPT

## 2022-05-20 PROCEDURE — 80053 COMPREHEN METABOLIC PANEL: CPT

## 2022-05-20 PROCEDURE — 36415 COLL VENOUS BLD VENIPUNCTURE: CPT

## 2022-05-20 PROCEDURE — 82607 VITAMIN B-12: CPT

## 2022-05-20 PROCEDURE — 85025 COMPLETE CBC W/AUTO DIFF WBC: CPT

## 2022-05-20 PROCEDURE — 83036 HEMOGLOBIN GLYCOSYLATED A1C: CPT

## 2022-05-20 PROCEDURE — 84443 ASSAY THYROID STIM HORMONE: CPT

## 2022-05-20 PROCEDURE — 82306 VITAMIN D 25 HYDROXY: CPT

## 2022-06-05 DIAGNOSIS — Z51.81 ENCOUNTER FOR THERAPEUTIC DRUG MONITORING: ICD-10-CM

## 2022-06-05 DIAGNOSIS — E66.01 MORBID OBESITY WITH BMI OF 50.0-59.9, ADULT (HCC): ICD-10-CM

## 2022-06-06 DIAGNOSIS — Z51.81 ENCOUNTER FOR THERAPEUTIC DRUG MONITORING: ICD-10-CM

## 2022-06-06 DIAGNOSIS — E66.01 MORBID OBESITY WITH BMI OF 50.0-59.9, ADULT (HCC): ICD-10-CM

## 2022-06-06 RX ORDER — PHENTERMINE HYDROCHLORIDE 37.5 MG/1
37.5 TABLET ORAL EVERY MORNING
Qty: 30 TABLET | Refills: 1 | OUTPATIENT
Start: 2022-06-06

## 2022-06-06 RX ORDER — PHENTERMINE HYDROCHLORIDE 37.5 MG/1
TABLET ORAL
Qty: 30 TABLET | Refills: 0 | OUTPATIENT
Start: 2022-06-06

## 2022-06-06 NOTE — TELEPHONE ENCOUNTER
Requesting phentermine 37.5 mg  LOV: 5/4/22  RTC: 6 weeks  Last Relevant Labs: na  Filled: 5/4/22 #30 with 1 refills    Future Appointments   Date Time Provider Nuzhat Munoz   6/22/2022  3:40 PM IMTIAZ Salas [facility]     Denied refill as it is receipt confirmed at pharmacy requesting refill.   Due in July

## 2022-07-07 DIAGNOSIS — F32.A ANXIETY AND DEPRESSION: ICD-10-CM

## 2022-07-07 DIAGNOSIS — Z51.81 ENCOUNTER FOR THERAPEUTIC DRUG MONITORING: ICD-10-CM

## 2022-07-07 DIAGNOSIS — F41.9 ANXIETY AND DEPRESSION: ICD-10-CM

## 2022-07-07 DIAGNOSIS — E66.01 MORBID OBESITY WITH BMI OF 50.0-59.9, ADULT (HCC): ICD-10-CM

## 2022-07-08 RX ORDER — BUPROPION HYDROCHLORIDE 150 MG/1
TABLET ORAL
Qty: 30 TABLET | Refills: 1 | Status: SHIPPED | OUTPATIENT
Start: 2022-07-08

## 2022-08-02 ENCOUNTER — TELEPHONE (OUTPATIENT)
Dept: INTERNAL MEDICINE CLINIC | Facility: CLINIC | Age: 32
End: 2022-08-02

## 2022-08-02 DIAGNOSIS — E66.01 MORBID OBESITY WITH BMI OF 50.0-59.9, ADULT (HCC): ICD-10-CM

## 2022-08-02 DIAGNOSIS — R63.8 CRAVING FOR PARTICULAR FOOD: ICD-10-CM

## 2022-08-02 DIAGNOSIS — Z51.81 ENCOUNTER FOR THERAPEUTIC DRUG MONITORING: ICD-10-CM

## 2022-08-02 DIAGNOSIS — F32.A ANXIETY AND DEPRESSION: ICD-10-CM

## 2022-08-02 DIAGNOSIS — F41.9 ANXIETY AND DEPRESSION: ICD-10-CM

## 2022-08-02 RX ORDER — BUPROPION HYDROCHLORIDE 150 MG/1
150 TABLET ORAL EVERY MORNING
Qty: 30 TABLET | Refills: 1 | Status: CANCELLED | OUTPATIENT
Start: 2022-08-02

## 2022-08-03 RX ORDER — ESCITALOPRAM OXALATE 20 MG/1
20 TABLET ORAL DAILY
Qty: 90 TABLET | Refills: 0 | Status: SHIPPED | OUTPATIENT
Start: 2022-08-03

## 2022-08-03 NOTE — TELEPHONE ENCOUNTER
Requesting bupropion, naltrexone, phentermine  LOV: 5/4/22  RTC: 6 weeks  Last Relevant Labs: na  Filled: 7/8/22 #30 with 1 refills bupropion  Filled: 5/4/22 #15 with 1 refills  Naltrexone  Filled: 5/4/22 #30 with 1 refills  Phentermine last [place] 6[place]25/22 [place]    Future Appointments   Date Time Provider Nuzhat Munoz   9/29/2022 10:40 AM IMTIAZ Bernardo [facility]     Pt cancelled her appt 6/22 and made above appt on 8/2    Patient was informed with the refill of bupropion to schedule an appt asap and she did not do that until yesterday. Do you want to refill?

## 2022-08-03 NOTE — TELEPHONE ENCOUNTER
Future appt: Your appointments     Date & Time Appointment Department [facility])    Sep 29, 2022 10:40 AM CDT Follow up Visit with Gómez Montenegro, [address] , Weight Loss Clinic, [address] ([facility])    [facility] your primary care provider if your insurance requires a referral.    Please arrive 15 minutes prior to your scheduled appointment. Be sure to bring your current Insurance card, Photo ID, and medication bottles or a list of your current medications. A 24 hour notice is required to cancel any appointment or you may be charged a $40 No Show Fee. Important: 24 hour notice is required to cancel any appointment or you may be charged a $40 No Show Fee. Please notify your physician office. Genevieve Sorensen, [facility], [facility] [address]  [address] [phone]        Last Appointment with provider:   5/6/2022 Physical. Follow up in 6 months (11/6/22)  Last appointment at [facility] [place]:  5/6/2022  Cholesterol, Total (mg/dL)   Date Value   05/20/2022 160     HDL Cholesterol (mg/dL)   Date Value   05/20/2022 53     LDL Cholesterol (mg/dL)   Date Value   05/20/2022 98     Triglycerides (mg/dL)   Date Value   05/20/2022 40     Lab Results   Component Value Date     05/20/2022    A1C 5.4 05/20/2022     Lab Results   Component Value Date    TSH 1.750 05/20/2022       No follow-ups on file.

## 2022-08-04 RX ORDER — NALTREXONE HYDROCHLORIDE 50 MG/1
25 TABLET, FILM COATED ORAL
Qty: 15 TABLET | Refills: 1 | Status: SHIPPED | OUTPATIENT
Start: 2022-08-04

## 2022-08-04 RX ORDER — PHENTERMINE HYDROCHLORIDE 37.5 MG/1
37.5 TABLET ORAL EVERY MORNING
Qty: 30 TABLET | Refills: 1 | Status: SHIPPED | OUTPATIENT
Start: 2022-08-04

## 2022-08-07 RX ORDER — ESCITALOPRAM OXALATE 20 MG/1
20 TABLET ORAL DAILY
Qty: 90 TABLET | Refills: 0 | Status: CANCELLED | OUTPATIENT
Start: 2022-08-07

## 2022-08-08 RX ORDER — ESCITALOPRAM OXALATE 20 MG/1
20 TABLET ORAL DAILY
Qty: 90 TABLET | Refills: 0 | Status: CANCELLED | OUTPATIENT
Start: 2022-08-08

## 2022-08-08 NOTE — TELEPHONE ENCOUNTER
Future appt: Your appointments     Date & Time Appointment Department [facility])    Sep 29, 2022 10:40 AM CDT Follow up Visit with Dhiraj Ruvalcaba, [address] , Weight Loss Clinic, [address] ([facility])    [facility] your primary care provider if your insurance requires a referral.    Please arrive 15 minutes prior to your scheduled appointment. Be sure to bring your current Insurance card, Photo ID, and medication bottles or a list of your current medications. A 24 hour notice is required to cancel any appointment or you may be charged a $40 No Show Fee. Important: 24 hour notice is required to cancel any appointment or you may be charged a $40 No Show Fee. Please notify your physician office. Alfa Grant, [facility], [facility] [address]  [address] [address] [place] [phone]        Last Appointment with provider:   5/6/2022 Physical. Return in 6 months (11/6/22)   Last appointment at [facility] [place]:  5/6/2022  Cholesterol, Total (mg/dL)   Date Value   05/20/2022 160     HDL Cholesterol (mg/dL)   Date Value   05/20/2022 53     LDL Cholesterol (mg/dL)   Date Value   05/20/2022 98     Triglycerides (mg/dL)   Date Value   05/20/2022 40     Lab Results   Component Value Date     05/20/2022    A1C 5.4 05/20/2022     Lab Results   Component Value Date    TSH 1.750 05/20/2022       No follow-ups on file.

## 2022-08-08 NOTE — TELEPHONE ENCOUNTER
Spoke with patient. Amanda did not receive prescription on 8/3/22. I called Amanda and able to give a verbal order. Called patient- informed her amanda now has the prescription and to call me with any issues. She verbalized understanding.

## 2022-08-08 NOTE — TELEPHONE ENCOUNTER
Needs refill on her Escitalopram 20mg  [facility]: [facility].   Has been out since 8/5    Future Appointments   Date Time Provider Nuzhat Munoz   9/29/2022 10:40 AM IMTIAZ Woodall [facility]

## 2022-09-15 ENCOUNTER — OFFICE VISIT (OUTPATIENT)
Dept: INTERNAL MEDICINE CLINIC | Facility: CLINIC | Age: 32
End: 2022-09-15
Payer: COMMERCIAL

## 2022-09-15 VITALS
BODY MASS INDEX: 44.22 KG/M2 | HEIGHT: 64 IN | HEART RATE: 82 BPM | DIASTOLIC BLOOD PRESSURE: 82 MMHG | SYSTOLIC BLOOD PRESSURE: 116 MMHG | RESPIRATION RATE: 16 BRPM | WEIGHT: 259 LBS

## 2022-09-15 DIAGNOSIS — F32.A ANXIETY AND DEPRESSION: ICD-10-CM

## 2022-09-15 DIAGNOSIS — F41.9 ANXIETY AND DEPRESSION: ICD-10-CM

## 2022-09-15 DIAGNOSIS — Z51.81 ENCOUNTER FOR THERAPEUTIC DRUG MONITORING: Primary | ICD-10-CM

## 2022-09-15 DIAGNOSIS — R63.8 CRAVING FOR PARTICULAR FOOD: ICD-10-CM

## 2022-09-15 DIAGNOSIS — E66.01 MORBID OBESITY WITH BMI OF 50.0-59.9, ADULT (HCC): ICD-10-CM

## 2022-09-15 RX ORDER — BUPROPION HYDROCHLORIDE 150 MG/1
150 TABLET ORAL EVERY MORNING
Qty: 30 TABLET | Refills: 1 | Status: SHIPPED | OUTPATIENT
Start: 2022-09-15

## 2022-12-06 NOTE — TELEPHONE ENCOUNTER
Future appt: Your appointments     Date & Time Appointment Department [facility])    Jan 14, 2023 10:00 AM CST Follow Up Visit with Tiffanie Ochoa MD [address] [place] [address] ([facility])    Contact your primary care provider if your insurance requires a referral.    Please arrive 15 minutes prior to your scheduled appointment. Be sure to bring your current Insurance card, Photo ID, and medication bottles or a list of your current medications. A 24 hour notice is required to cancel any appointment or you may be charged a $40 No Show Fee. Important: 24 hour notice is required to cancel any appointment or you may be charged a $40 No Show Fee. Please notify your physician office. [id number]  [phone]        Last Appointment with provider:   Visit date not found  Last appointment at [facility] [place]:  Visit date not found  Cholesterol, Total (mg/dL)   Date Value   05/20/2022 160     HDL Cholesterol (mg/dL)   Date Value   05/20/2022 53     LDL Cholesterol (mg/dL)   Date Value   05/20/2022 98     Triglycerides (mg/dL)   Date Value   05/20/2022 40     Lab Results   Component Value Date     05/20/2022    A1C 5.4 05/20/2022     Lab Results   Component Value Date    TSH 1.750 05/20/2022     Last RF:  8/3/2022    No follow-ups on file.

## 2022-12-06 NOTE — TELEPHONE ENCOUNTER
Future appt: Your appointments     Date & Time Appointment Department [facility])    Jan 14, 2023 10:00 AM CST Follow Up Visit with Namrata Farmer MD [address] [place] [address] ([facility])    Contact your primary care provider if your insurance requires a referral.    Please arrive 15 minutes prior to your scheduled appointment. Be sure to bring your current Insurance card, Photo ID, and medication bottles or a list of your current medications. A 24 hour notice is required to cancel any appointment or you may be charged a $40 No Show Fee. Important: 24 hour notice is required to cancel any appointment or you may be charged a $40 No Show Fee. Please notify your physician office. [facility]  [facility] [address] [phone]  [phone]        Last Appointment with provider:   Visit date not found  Last appointment at [facility]:  Visit date not found  Cholesterol, Total (mg/dL)   Date Value   05/20/2022 160     HDL Cholesterol (mg/dL)   Date Value   05/20/2022 53     LDL Cholesterol (mg/dL)   Date Value   05/20/2022 98     Triglycerides (mg/dL)   Date Value   05/20/2022 40     Lab Results   Component Value Date     05/20/2022    A1C 5.4 05/20/2022     Lab Results   Component Value Date    TSH 1.750 05/20/2022     Last RF:  5/6/2022    No follow-ups on file.

## 2022-12-07 RX ORDER — ALPRAZOLAM 0.25 MG/1
0.25 TABLET ORAL 2 TIMES DAILY
Qty: 15 TABLET | Refills: 0 | Status: SHIPPED | OUTPATIENT
Start: 2022-12-07

## 2022-12-07 RX ORDER — ESCITALOPRAM OXALATE 20 MG/1
20 TABLET ORAL DAILY
Qty: 90 TABLET | Refills: 0 | Status: SHIPPED | OUTPATIENT
Start: 2022-12-07

## 2023-01-16 ENCOUNTER — PATIENT MESSAGE (OUTPATIENT)
Dept: FAMILY MEDICINE CLINIC | Facility: CLINIC | Age: 33
End: 2023-01-16

## 2023-01-16 DIAGNOSIS — H91.93 BILATERAL HEARING LOSS, UNSPECIFIED HEARING LOSS TYPE: Primary | ICD-10-CM

## 2023-01-16 NOTE — TELEPHONE ENCOUNTER
From: Breanne Bellamy  To: Aria Nunez MD  Sent: 1/16/2023 11:24 AM CST  Subject: ENT    Good morning,   Dr. Honey Cheng recommended I see and ENT[facility] Can you recommend an ENT in the [facility] group? ?   Thank you

## 2023-02-22 ENCOUNTER — OFFICE VISIT (OUTPATIENT)
Dept: INTERNAL MEDICINE CLINIC | Facility: CLINIC | Age: 33
End: 2023-02-22
Payer: COMMERCIAL

## 2023-02-22 ENCOUNTER — OFFICE VISIT (OUTPATIENT)
Facility: LOCATION | Age: 33
End: 2023-02-22
Payer: COMMERCIAL

## 2023-02-22 VITALS
RESPIRATION RATE: 16 BRPM | WEIGHT: 293 LBS | BODY MASS INDEX: 50.02 KG/M2 | DIASTOLIC BLOOD PRESSURE: 70 MMHG | HEIGHT: 64 IN | SYSTOLIC BLOOD PRESSURE: 114 MMHG | HEART RATE: 67 BPM | OXYGEN SATURATION: 98 %

## 2023-02-22 DIAGNOSIS — R63.5 WEIGHT GAIN: ICD-10-CM

## 2023-02-22 DIAGNOSIS — R63.8 CRAVING FOR PARTICULAR FOOD: ICD-10-CM

## 2023-02-22 DIAGNOSIS — F32.A ANXIETY AND DEPRESSION: ICD-10-CM

## 2023-02-22 DIAGNOSIS — E66.01 MORBID OBESITY WITH BMI OF 50.0-59.9, ADULT (HCC): ICD-10-CM

## 2023-02-22 DIAGNOSIS — H93.293 ABNORMAL AUDITORY PERCEPTION OF BOTH EARS: Primary | ICD-10-CM

## 2023-02-22 DIAGNOSIS — Z51.81 ENCOUNTER FOR THERAPEUTIC DRUG MONITORING: Primary | ICD-10-CM

## 2023-02-22 DIAGNOSIS — F41.9 ANXIETY AND DEPRESSION: ICD-10-CM

## 2023-02-22 DIAGNOSIS — H93.13 TINNITUS OF BOTH EARS: Primary | ICD-10-CM

## 2023-02-22 PROCEDURE — 3078F DIAST BP <80 MM HG: CPT | Performed by: NURSE PRACTITIONER

## 2023-02-22 PROCEDURE — 99214 OFFICE O/P EST MOD 30 MIN: CPT | Performed by: NURSE PRACTITIONER

## 2023-02-22 PROCEDURE — 92557 COMPREHENSIVE HEARING TEST: CPT | Performed by: AUDIOLOGIST

## 2023-02-22 PROCEDURE — 3008F BODY MASS INDEX DOCD: CPT | Performed by: NURSE PRACTITIONER

## 2023-02-22 PROCEDURE — 99203 OFFICE O/P NEW LOW 30 MIN: CPT | Performed by: OTOLARYNGOLOGY

## 2023-02-22 PROCEDURE — 92567 TYMPANOMETRY: CPT | Performed by: AUDIOLOGIST

## 2023-02-22 PROCEDURE — 3074F SYST BP LT 130 MM HG: CPT | Performed by: NURSE PRACTITIONER

## 2023-02-22 NOTE — PROGRESS NOTES
José Manuel Ramey was seen for audiometric evaluation today. Referred back to physician.      Agusto Jaffe

## 2023-02-27 ENCOUNTER — TELEPHONE (OUTPATIENT)
Dept: INTERNAL MEDICINE CLINIC | Facility: CLINIC | Age: 33
End: 2023-02-27

## 2023-02-27 ENCOUNTER — PATIENT MESSAGE (OUTPATIENT)
Dept: INTERNAL MEDICINE CLINIC | Facility: CLINIC | Age: 33
End: 2023-02-27

## 2023-02-27 NOTE — TELEPHONE ENCOUNTER
From: Chris Bee  To: IMTIAZ Bowen  Sent: 2/27/2023 10:01 AM CST  Subject: New medication    Good morning! I called the pharmacy about the meds you sent over last week and they said they were waiting on a response from you?    Can you look into that and let me know!!    Thank you  Simon Chan

## 2023-03-01 NOTE — TELEPHONE ENCOUNTER
Notice that wegovy is excluded from coverage  [id number]    Patient wants to resume meds she use to take.   Please recommend

## 2023-03-03 NOTE — TELEPHONE ENCOUNTER
Switch to generic alternative to Qsymia (www.qsymia.com) as discussed at [address] [place] [address]. Send script for Topamax 25 mg, si tab daily for 7 days, then increase to 1 tab BID. #60 with 1 refill and Phentermine 15 mg, si tab daily in AM. #60 with 1 refill.

## 2023-03-07 RX ORDER — TOPIRAMATE 25 MG/1
TABLET ORAL
Qty: 60 TABLET | Refills: 1 | Status: SHIPPED | OUTPATIENT
Start: 2023-03-07

## 2023-03-07 RX ORDER — PHENTERMINE HYDROCHLORIDE 15 MG/1
15 CAPSULE ORAL EVERY MORNING
Qty: 30 CAPSULE | Refills: 1 | Status: SHIPPED | OUTPATIENT
Start: 2023-03-07

## 2023-04-18 RX ORDER — ESCITALOPRAM OXALATE 20 MG/1
20 TABLET ORAL DAILY
Qty: 90 TABLET | Refills: 0 | Status: SHIPPED | OUTPATIENT
Start: 2023-04-18

## 2023-04-18 NOTE — TELEPHONE ENCOUNTER
Future appt:     Last Appointment with provider:   1/14/2023; Return in about 6 months (around 7/14/2023) for physical.    Last appointment at [facility] [place]:  1/14/2023  Cholesterol, Total (mg/dL)   Date Value   05/20/2022 160     HDL Cholesterol (mg/dL)   Date Value   05/20/2022 53     LDL Cholesterol (mg/dL)   Date Value   05/20/2022 98     Triglycerides (mg/dL)   Date Value   05/20/2022 40     Lab Results   Component Value Date     05/20/2022    A1C 5.4 05/20/2022     Lab Results   Component Value Date    TSH 1.750 05/20/2022     Last RF:  12/7/2022    No follow-ups on file.

## 2023-04-21 RX ORDER — PHENTERMINE HYDROCHLORIDE 15 MG/1
15 CAPSULE ORAL EVERY MORNING
Qty: 30 CAPSULE | Refills: 1 | Status: SHIPPED | OUTPATIENT
Start: 2023-04-21

## 2023-04-21 RX ORDER — TOPIRAMATE 50 MG/1
50 TABLET, FILM COATED ORAL 2 TIMES DAILY
Qty: 60 TABLET | Refills: 2 | Status: SHIPPED | OUTPATIENT
Start: 2023-04-21

## 2023-04-21 NOTE — TELEPHONE ENCOUNTER
Approved dose increase to 50 mg BID per patient request. Please have her schedule f/u via VV (if seen in clinic in the past 6 months) or in clinic.  Thanks

## 2023-05-02 ENCOUNTER — TELEMEDICINE (OUTPATIENT)
Dept: INTERNAL MEDICINE CLINIC | Facility: CLINIC | Age: 33
End: 2023-05-02

## 2023-05-02 DIAGNOSIS — Z51.81 ENCOUNTER FOR THERAPEUTIC DRUG MONITORING: Primary | ICD-10-CM

## 2023-05-02 DIAGNOSIS — R63.8 CRAVING FOR PARTICULAR FOOD: ICD-10-CM

## 2023-05-02 DIAGNOSIS — E66.01 MORBID OBESITY WITH BMI OF 50.0-59.9, ADULT (HCC): ICD-10-CM

## 2023-05-02 PROCEDURE — 99213 OFFICE O/P EST LOW 20 MIN: CPT | Performed by: NURSE PRACTITIONER

## 2023-05-02 RX ORDER — TOPIRAMATE 100 MG/1
100 TABLET, FILM COATED ORAL 2 TIMES DAILY
Qty: 60 TABLET | Refills: 2 | Status: SHIPPED | OUTPATIENT
Start: 2023-05-02

## 2023-05-02 RX ORDER — PHENTERMINE HYDROCHLORIDE 37.5 MG/1
37.5 TABLET ORAL EVERY MORNING
Qty: 30 TABLET | Refills: 2 | Status: SHIPPED | OUTPATIENT
Start: 2023-05-02

## 2023-07-13 ENCOUNTER — OFFICE VISIT (OUTPATIENT)
Dept: INTERNAL MEDICINE CLINIC | Facility: CLINIC | Age: 33
End: 2023-07-13
Payer: COMMERCIAL

## 2023-07-13 VITALS
BODY MASS INDEX: 49.85 KG/M2 | RESPIRATION RATE: 16 BRPM | SYSTOLIC BLOOD PRESSURE: 124 MMHG | DIASTOLIC BLOOD PRESSURE: 76 MMHG | HEIGHT: 64 IN | WEIGHT: 292 LBS | HEART RATE: 76 BPM

## 2023-07-13 DIAGNOSIS — E66.01 MORBID OBESITY WITH BMI OF 50.0-59.9, ADULT (HCC): ICD-10-CM

## 2023-07-13 DIAGNOSIS — R63.8 CRAVING FOR PARTICULAR FOOD: ICD-10-CM

## 2023-07-13 DIAGNOSIS — Z51.81 ENCOUNTER FOR THERAPEUTIC DRUG MONITORING: Primary | ICD-10-CM

## 2023-07-13 DIAGNOSIS — E66.01 MORBID OBESITY WITH BMI OF 50.0-59.9, ADULT (HCC): Primary | ICD-10-CM

## 2023-07-13 PROCEDURE — 3078F DIAST BP <80 MM HG: CPT | Performed by: NURSE PRACTITIONER

## 2023-07-13 PROCEDURE — 99213 OFFICE O/P EST LOW 20 MIN: CPT | Performed by: NURSE PRACTITIONER

## 2023-07-13 PROCEDURE — 3074F SYST BP LT 130 MM HG: CPT | Performed by: NURSE PRACTITIONER

## 2023-07-13 PROCEDURE — 97802 MEDICAL NUTRITION INDIV IN: CPT

## 2023-07-13 PROCEDURE — 3008F BODY MASS INDEX DOCD: CPT | Performed by: NURSE PRACTITIONER

## 2023-07-13 RX ORDER — PHENTERMINE HYDROCHLORIDE 37.5 MG/1
37.5 TABLET ORAL EVERY MORNING
Qty: 30 TABLET | Refills: 2 | Status: SHIPPED | OUTPATIENT
Start: 2023-07-13

## 2023-07-13 RX ORDER — TOPIRAMATE 100 MG/1
100 TABLET, FILM COATED ORAL 2 TIMES DAILY
Qty: 180 TABLET | Refills: 1 | Status: SHIPPED | OUTPATIENT
Start: 2023-07-13

## 2023-08-03 NOTE — TELEPHONE ENCOUNTER
My chart message sent to pt for appt        Last appt 1/14/23 advised Return in about 6 months (around 7/14/2023) for physical.      Future Appointments   Date Time Provider Nuzhat Munoz   8/14/2023 11:00 AM Serita Gaucher, [facility] COHENI DISHA [facility]   8/15/2023  8:30 AM Alirio Palmer RD [facility]

## 2023-08-04 RX ORDER — ESCITALOPRAM OXALATE 20 MG/1
20 TABLET ORAL DAILY
Qty: 90 TABLET | Refills: 0 | Status: SHIPPED | OUTPATIENT
Start: 2023-08-04

## 2023-08-04 NOTE — TELEPHONE ENCOUNTER
Appt made    Future Appointments   Date Time Provider Nuzhat Munoz   8/14/2023 11:00 AM [facility]   8/15/2023  8:30 AM Otto Echavarria RD [facility] [address]   8/18/2023  3:30 PM Elías Dhillon MD [facility] [place]

## 2023-08-07 RX ORDER — ALPRAZOLAM 0.25 MG/1
0.25 TABLET ORAL 2 TIMES DAILY
Qty: 15 TABLET | Refills: 0 | Status: SHIPPED | OUTPATIENT
Start: 2023-08-07

## 2023-08-07 NOTE — TELEPHONE ENCOUNTER
Alprazolam: 12/7/22      Return in about 6 months (around 7/14/2023) for physical.    Future Appointments   Date Time Provider Nuzhat Munoz   8/14/2023 11:00 AM Daphnie Berman [facility]   8/15/2023  8:30 AM Erin Burton RD [facility] [address]   8/18/2023  3:30 PM Pretty Drew MD [facility] [place]

## 2023-10-16 DIAGNOSIS — E66.01 MORBID OBESITY WITH BMI OF 50.0-59.9, ADULT (HCC): ICD-10-CM

## 2023-10-16 DIAGNOSIS — Z51.81 ENCOUNTER FOR THERAPEUTIC DRUG MONITORING: ICD-10-CM

## 2023-10-16 DIAGNOSIS — R63.8 CRAVING FOR PARTICULAR FOOD: ICD-10-CM

## 2023-10-16 RX ORDER — TOPIRAMATE 100 MG/1
100 TABLET, FILM COATED ORAL 2 TIMES DAILY
Qty: 60 TABLET | Refills: 0 | OUTPATIENT
Start: 2023-10-16

## 2023-10-16 RX ORDER — TOPIRAMATE 100 MG/1
100 TABLET, FILM COATED ORAL 2 TIMES DAILY
Qty: 180 TABLET | Refills: 1 | OUTPATIENT
Start: 2023-10-16

## 2023-10-16 NOTE — TELEPHONE ENCOUNTER
Requesting   Requested Prescriptions     Pending Prescriptions Disp Refills    TOPIRAMATE 100 MG Oral Tab [Pharmacy Med Name: TOPIRAMATE 100MG TABLETS] 60 tablet 0     Sig: TAKE 1 TABLET(100 MG) BY MOUTH TWICE DAILY     LOV: 7/13/23  RTC: 3 months  Filled: 7/13/23 #180 with 1 refills    Future Appointments   Date Time Provider Nuzhat Munoz   12/20/2023  9:40 AM IMTIAZ Kate [facility] [address]     Refill too early

## 2023-10-31 ENCOUNTER — TELEMEDICINE (OUTPATIENT)
Dept: INTERNAL MEDICINE CLINIC | Facility: CLINIC | Age: 33
End: 2023-10-31

## 2023-10-31 DIAGNOSIS — F32.A ANXIETY AND DEPRESSION: ICD-10-CM

## 2023-10-31 DIAGNOSIS — R63.8 CRAVING FOR PARTICULAR FOOD: ICD-10-CM

## 2023-10-31 DIAGNOSIS — E66.01 MORBID OBESITY WITH BMI OF 50.0-59.9, ADULT (HCC): ICD-10-CM

## 2023-10-31 DIAGNOSIS — Z51.81 ENCOUNTER FOR THERAPEUTIC DRUG MONITORING: Primary | ICD-10-CM

## 2023-10-31 DIAGNOSIS — F41.9 ANXIETY AND DEPRESSION: ICD-10-CM

## 2023-10-31 PROCEDURE — 99213 OFFICE O/P EST LOW 20 MIN: CPT | Performed by: NURSE PRACTITIONER

## 2023-10-31 RX ORDER — METFORMIN HYDROCHLORIDE 750 MG/1
1500 TABLET, EXTENDED RELEASE ORAL
Qty: 60 TABLET | Refills: 2 | Status: SHIPPED | OUTPATIENT
Start: 2023-10-31

## 2023-10-31 RX ORDER — PHENTERMINE HYDROCHLORIDE 37.5 MG/1
37.5 TABLET ORAL EVERY MORNING
Qty: 30 TABLET | Refills: 2 | Status: SHIPPED | OUTPATIENT
Start: 2023-10-31

## 2024-01-31 DIAGNOSIS — E66.01 MORBID OBESITY WITH BMI OF 50.0-59.9, ADULT (HCC): ICD-10-CM

## 2024-01-31 DIAGNOSIS — R63.8 CRAVING FOR PARTICULAR FOOD: ICD-10-CM

## 2024-01-31 DIAGNOSIS — Z51.81 ENCOUNTER FOR THERAPEUTIC DRUG MONITORING: ICD-10-CM

## 2024-01-31 NOTE — TELEPHONE ENCOUNTER
Requesting   Requested Prescriptions     Pending Prescriptions Disp Refills    topiramate 100 MG Oral Tab 180 tablet 1     Sig: Take 1 tablet (100 mg total) by mouth 2 (two) times daily.    Phentermine HCl 37.5 MG Oral Tab 30 tablet 2     Sig: Take 1 tablet (37.5 mg total) by mouth every morning.     LOV: 10/31/23  RTC: 3 months  Filled: phen 10/31/23 #30 with 2 refills  Topiramte 7/13/23 #180 with 1 refill    Future Appointments   Date Time Provider Department Center   5/15/2024  3:20 PM Hazel Melendez APRN [facility]

## 2024-02-02 DIAGNOSIS — R63.8 CRAVING FOR PARTICULAR FOOD: ICD-10-CM

## 2024-02-02 DIAGNOSIS — Z51.81 ENCOUNTER FOR THERAPEUTIC DRUG MONITORING: ICD-10-CM

## 2024-02-02 DIAGNOSIS — E66.01 MORBID OBESITY WITH BMI OF 50.0-59.9, ADULT (HCC): ICD-10-CM

## 2024-02-02 RX ORDER — TOPIRAMATE 100 MG/1
100 TABLET, FILM COATED ORAL 2 TIMES DAILY
Qty: 60 TABLET | Refills: 0 | Status: SHIPPED | OUTPATIENT
Start: 2024-02-02

## 2024-02-02 RX ORDER — TOPIRAMATE 100 MG/1
100 TABLET, FILM COATED ORAL 2 TIMES DAILY
Qty: 180 TABLET | Refills: 0 | OUTPATIENT
Start: 2024-02-02

## 2024-02-02 RX ORDER — PHENTERMINE HYDROCHLORIDE 37.5 MG/1
37.5 TABLET ORAL EVERY MORNING
Qty: 30 TABLET | Refills: 0 | Status: SHIPPED | OUTPATIENT
Start: 2024-02-02

## 2024-02-02 NOTE — TELEPHONE ENCOUNTER
Over due for f/u in clinic ONLY. Send 1 month courtesy. She will need to go on wait list and check schedule for openings weekly. Was advised at LOV to f/u in clinic in 3 months.

## 2024-03-08 DIAGNOSIS — E66.01 MORBID OBESITY WITH BMI OF 50.0-59.9, ADULT (HCC): ICD-10-CM

## 2024-03-08 DIAGNOSIS — Z51.81 ENCOUNTER FOR THERAPEUTIC DRUG MONITORING: ICD-10-CM

## 2024-03-08 NOTE — TELEPHONE ENCOUNTER
Requesting   Requested Prescriptions     Pending Prescriptions Disp Refills    Phentermine HCl 37.5 MG Oral Tab 30 tablet 0     Sig: Take 1 tablet (37.5 mg total) by mouth every morning.     LOV: 10/31/23  RTC: 3 months  Filled: 2/2/23 #30 with 0 refills    Future Appointments   Date Time Provider Department Center   5/15/2024  3:20 PM Hazel Melendez APRN [facility]

## 2024-03-09 RX ORDER — PHENTERMINE HYDROCHLORIDE 37.5 MG/1
37.5 TABLET ORAL EVERY MORNING
Qty: 30 TABLET | Refills: 0 | OUTPATIENT
Start: 2024-03-09

## 2024-03-09 NOTE — TELEPHONE ENCOUNTER
Refused. Patient is over due for follow up and will require visit for medication refills and any dose titrations not documented on previous AVS. May is too far out. Please advise patient to schedule, add self to wait list, and monitor my schedule daily for openings due to cancellations, most commonly found over the weekend, for a Monday appointment in [place]. I also recommend patient schedules in advance for future appointments, typically after their next appointment, to avoid disruption in their treatment plan. Thank you.

## 2024-04-10 ENCOUNTER — OFFICE VISIT (OUTPATIENT)
Dept: INTERNAL MEDICINE CLINIC | Facility: CLINIC | Age: 34
End: 2024-04-10
Payer: COMMERCIAL

## 2024-04-10 VITALS
RESPIRATION RATE: 16 BRPM | HEART RATE: 60 BPM | BODY MASS INDEX: 47.29 KG/M2 | SYSTOLIC BLOOD PRESSURE: 116 MMHG | WEIGHT: 277 LBS | DIASTOLIC BLOOD PRESSURE: 78 MMHG | HEIGHT: 64 IN

## 2024-04-10 DIAGNOSIS — Z51.81 ENCOUNTER FOR THERAPEUTIC DRUG MONITORING: Primary | ICD-10-CM

## 2024-04-10 DIAGNOSIS — F41.9 ANXIETY AND DEPRESSION: ICD-10-CM

## 2024-04-10 DIAGNOSIS — F32.A ANXIETY AND DEPRESSION: ICD-10-CM

## 2024-04-10 DIAGNOSIS — R63.8 CRAVING FOR PARTICULAR FOOD: ICD-10-CM

## 2024-04-10 DIAGNOSIS — E66.01 CLASS 3 SEVERE OBESITY WITHOUT SERIOUS COMORBIDITY WITH BODY MASS INDEX (BMI) OF 45.0 TO 49.9 IN ADULT, UNSPECIFIED OBESITY TYPE (HCC): ICD-10-CM

## 2024-04-10 PROCEDURE — 3074F SYST BP LT 130 MM HG: CPT | Performed by: NURSE PRACTITIONER

## 2024-04-10 PROCEDURE — 3008F BODY MASS INDEX DOCD: CPT | Performed by: NURSE PRACTITIONER

## 2024-04-10 PROCEDURE — 99214 OFFICE O/P EST MOD 30 MIN: CPT | Performed by: NURSE PRACTITIONER

## 2024-04-10 PROCEDURE — 3078F DIAST BP <80 MM HG: CPT | Performed by: NURSE PRACTITIONER

## 2024-04-10 RX ORDER — TOPIRAMATE 100 MG/1
100 TABLET, FILM COATED ORAL 2 TIMES DAILY
Qty: 180 TABLET | Refills: 1 | Status: SHIPPED | OUTPATIENT
Start: 2024-04-10

## 2024-04-10 RX ORDER — VENLAFAXINE HYDROCHLORIDE 75 MG/1
75 CAPSULE, EXTENDED RELEASE ORAL DAILY
COMMUNITY
End: 2024-04-10

## 2024-04-10 NOTE — PATIENT INSTRUCTIONS
Continue making lifestyle changes that focus on good nutrition, regular exercise and stress management.    Medication Plan: Continue Topamax at current dose. Start Wegovy at .25 mg weekly x4 weeks and then increase to .5 mg weekly.  Maintain this dose until next visit. Any further dose titrations beyond this dose will be considered at appointments only, unless otherwise discussed. Visit [email or web address] and sign up for Lenora to receive medication savings card and FREE health coaching. If supply prohibitive: look for alternative pharmacy like [facility] and [facility].     Tips while taking an injectable weight loss medication:    Be an intuitive eater. Listen to your hunger and fullness signals, stopping when you are full.  Consume protein and produce in your day, striving for a rainbow of color of produce.  Reduce portions to staring size of 1 cup size and check in with your gut to see if you are full. Set the timer to slow down your eating pace to allow for 15-20 minutes to complete a meal.  Reduce refined sugars and high fat foods, as they may contribute to greater side effects of nausea and heartburn.  Stop eating 3 hours before bedtime to allow your food to digest.  Remain hydrated with water or non caloric and non caffeine beverages.  Use over the counter carolina lozenge/supplement to help reduce nausea if needed.  If you have been off your medication for more than 2 weeks please notify our office to determine next dosing, as return to previous dose may not be appropriate or tolerated.    Next steps to work on before next office visit include:     Balanced Nutrition includes:     Build the mentality of Food 4 Fuel. Clean eating with whole foods and eliminating/reducing ultra processed foods.  Be an intuitive eater and using mindful eating practices.  Eat a balanced plate with protein and produce at all meals: 1/4 plate- protein, 1/2 plate non starchy veggies, and 1/4 plate fruit or complex carbohydrate.  Drink  water with all meals and use a salad plate to naturally reduce portions.  Eliminate/reduce late night eating by stopping after 7pm. Allowing your body to fast for 12 hours (drink only water, tea or black coffee without any additives).            Mindful Eating Tips:  When we sit down to a meal by ourselves or with friends, it's easy to zone out and disconnect from our bodies. But, if you approach eating a meal with the intent to stay mindful and present, you will be able to enjoy yourself and walk away from the table feeling good about yourself and your choices. Here are several tips to keep in mind when it comes to food and eating.    Choose for yourself: Do not get hung up on what other people are eating. Instead, ask yourself what you would like to eat.    Forget about good and bad: Remind yourself that foods fall on a nutritional continuum (high value/low value), not on a moral continuum (good/bad).    Stay clear of guilt or shame: Refrain from allowing guilt or shame to contaminate your eating decisions. Avoid secret eating and get clear on who you are really hiding from if you eat in secret.    Choose foods that you like: Do not eat foods that you do not find satisfying or enjoyable. Eating that way will make you feel like you are on a diet.    Look before you eat: Before you eat, look at your food, its portion size, and presentation. Breathe deeply. Look again before taking a mouthful.    Chew every mouthful: Chewing a lot helps to thoroughly release the flavor of foods. Let food sit on your tongue. This allows your taste buds to absorb the flavor and transmit messages about your appetite to your brain.    Talk or eat: When you are talking, stop eating. When you are eating, stop talking.    Stay connected: Pay attention to your body's appetite signals while you are eating.    Pause while you are eating: Think about how you are feeling about your food in terms of quality and quantity.    Know when to stop  eating: Stop eating when flavor intensity declines, as it is bound to do. Do not try to polish off all of the food in front of you. Instead, aim for the moment when flavor peaks and you feel an internal “ah” of satisfaction--then stop.    Evaluate how full you are: Keep asking yourself while you are eating, “Am I still hungry?” and “Am I satisfied?”    by Cely Lyons [facility]  and

## 2024-04-10 NOTE — PROGRESS NOTES
Beata Wang is a 33 year old female presents today for follow-up on medical weight loss program for the treatment of overweight, obesity, or morbid obesity.    S:  Current weight   Wt Readings from Last 6 Encounters:   04/10/24 277 lb (125.6 kg)   09/14/23 288 lb (130.6 kg)   07/13/23 292 lb (132.5 kg)   02/22/23 295 lb (133.8 kg)   01/14/23 289 lb (131.1 kg)   09/15/22 259 lb (117.5 kg)    AND BMI Body mass index is 47.55 kg/m²..    Patient has lost 15# since LOV in 7/2023 via clinic and via VV in 10/2023. She has been consistent on Topamax, but off Metformin ER and phentermine. She has started to reduce her food portions and working on healthier food choices.    Testing/consult completed since LOV: Dietician: Uma on 7/13/2023. Estimated caloric needs for weight loss: 5270-6479 cals/d for 1-2 pounds/week weight loss.    [facility] Medical Weight Loss Follow Up    Question 4/10/2024  6:29 AM CDT - Filed by Patient   Please describe a success moment: None   Please describe a challenging moment/needs for improvement: All of them   Please complete this 24 hour food journal, listing everything you had to eat in the past day. Include the average time of day you ate these meals at    List foods, qty and prep for breakfast: Gatorade   List foods, qty and prep for lunch. Fast food   List foods, qty and prep for dinner. Varies   List foods, qty and prep for snacks. Fruit snacks   List the types and qty of fluids consumed Gatorade and milk   On average, how many meals did you eat out per week? 5   Exercise    How many days per week are you active or exercise 1   On average, how many days were anaerobic (strength/resistance) exercises performed? 0   On average, how many days were aerobic (cardio) exercises performed? 0   Perceived level of exertion on a scale of 1-5, with 5 being very intense: 1   Stress    Average stress level on a scale of 1-10, with 10 being extremely stressed: 7   If greater than 5/1O how would you grade  your coping mechanisms? moderate   Sleep hours and integrity    How many hours of uninterrupted sleep do you get a night: 4   Do you feel rested in the morning:    If no, what may have been disrupting your sleep? Not enough time to sleep   Please list any goal(s) for your next visit Get back on track       Social hx and PMH reviewed. Employed in [employer], working days with a 2nd job [employer] evenings and weekends.  with son.    REVIEW OF SYSTEMS:  GENERAL: feels well otherwise  LUNGS: denies shortness of breath with exertion  CARDIOVASCULAR: denies chest pain on exertion, denies palpitations or pedal edema  GI: denies abdominal pain.  No N/V/D/C  MUSCULOSKELETAL: no acute joint or muscle pain  NEURO: denies headaches, migraines controlled  PSYCH: denies change in behavior or mood, denies feeling sad or depressed    EXAM:  /78   Pulse 60   Resp 16   Ht 5' 4\" (1.626 m)   Wt 277 lb (125.6 kg)   LMP 04/03/2024 (Exact Date)   BMI 47.55 kg/m²   GENERAL: well developed, well nourished, in no apparent distress, morbidly obese  EYES: conjunctiva pink, sclera non icteric, PERRLA  LUNGS: CTA in all fields, breathing non labored  CARDIO: RRR without murmur, normal S1 and S2 without clicks or gallops, no pedal edema.  GI: +BS  NEURO/MS: motor and sensory grossly intact  PSYCH: pleasant, cooperative, slightly tearing when discussing love of self    ASSESSMENT AND PLAN:  Reviewed Initial Weight Data and Goal Weight Loss:       Encounter Diagnoses   Name Primary?    Encounter for therapeutic drug monitoring Yes    Class 3 severe obesity without serious comorbidity with body mass index (BMI) of 45.0 to 49.9 in adult, unspecified obesity type (HCC)     Craving for particular food     Anxiety and depression        No orders of the defined types were placed in this encounter.      Meds & Refills for this Visit:  Requested Prescriptions     Signed Prescriptions Disp Refills    topiramate 100 MG Oral Tab 180  tablet 1     Sig: Take 1 tablet (100 mg total) by mouth 2 (two) times daily.    semaglutide-weight management 0.25 MG/0.5ML Subcutaneous Solution Auto-injector 2 mL 0     Sig: Inject 0.5 mL (0.25 mg total) into the skin once a week.    semaglutide-weight management 0.5 MG/0.5ML Subcutaneous Solution Auto-injector 2 mL 1     Sig: Inject 0.5 mL (0.5 mg total) into the skin once a week. Start after completing .25 mg weekly dose x4 weeks.       Imaging & Consults:  None      Plan:  Patient has lost 15# since LOV in 2/2022 off phentermine 37.5 mg daily, on Topamax 100 mg BID, off Metformin ER 1500 mg daily with a total weight loss of 28# since initial consult on 8/16/21 with initial weight of 305#.  Weight loss goal: weight loss of >50#.  Continue Topamax, remain off phentermine and Metformin ER. Add Wegovy as directed. No AOM coverage by past insurance. Hx of naltrexone and Wellbutrin XL.  on  balanced nutrition and mindful eating. See patient instructions below for additional plans and patient counseling.      Patient Instructions   Continue making lifestyle changes that focus on good nutrition, regular exercise and stress management.    Medication Plan: Continue Topamax at current dose. Start Wegovy at .25 mg weekly x4 weeks and then increase to .5 mg weekly.  Maintain this dose until next visit. Any further dose titrations beyond this dose will be considered at appointments only, unless otherwise discussed. Visit [email or web address] and sign up for Lenora to receive medication savings card and FREE health coaching. If supply prohibitive: look for alternative pharmacy like [facility].     Tips while taking an injectable weight loss medication:    Be an intuitive eater. Listen to your hunger and fullness signals, stopping when you are full.  Consume protein and produce in your day, striving for a rainbow of color of produce.  Reduce portions to staring size of 1 cup size and check in with your gut to see  if you are full. Set the timer to slow down your eating pace to allow for 15-20 minutes to complete a meal.  Reduce refined sugars and high fat foods, as they may contribute to greater side effects of nausea and heartburn.  Stop eating 3 hours before bedtime to allow your food to digest.  Remain hydrated with water or non caloric and non caffeine beverages.  Use over the counter carolina lozenge/supplement to help reduce nausea if needed.  If you have been off your medication for more than 2 weeks please notify our office to determine next dosing, as return to previous dose may not be appropriate or tolerated.    Next steps to work on before next office visit include:     Balanced Nutrition includes:     Build the mentality of Food 4 Fuel. Clean eating with whole foods and eliminating/reducing ultra processed foods.  Be an intuitive eater and using mindful eating practices.  Eat a balanced plate with protein and produce at all meals: 1/4 plate- protein, 1/2 plate non starchy veggies, and 1/4 plate fruit or complex carbohydrate.  Drink water with all meals and use a salad plate to naturally reduce portions.  Eliminate/reduce late night eating by stopping after 7pm. Allowing your body to fast for 12 hours (drink only water, tea or black coffee without any additives).            Mindful Eating Tips:  When we sit down to a meal by ourselves or with friends, it's easy to zone out and disconnect from our bodies. But, if you approach eating a meal with the intent to stay mindful and present, you will be able to enjoy yourself and walk away from the table feeling good about yourself and your choices. Here are several tips to keep in mind when it comes to food and eating.    Choose for yourself: Do not get hung up on what other people are eating. Instead, ask yourself what you would like to eat.    Forget about good and bad: Remind yourself that foods fall on a nutritional continuum (high value/low value), not on a moral  continuum (good/bad).    Stay clear of guilt or shame: Refrain from allowing guilt or shame to contaminate your eating decisions. Avoid secret eating and get clear on who you are really hiding from if you eat in secret.    Choose foods that you like: Do not eat foods that you do not find satisfying or enjoyable. Eating that way will make you feel like you are on a diet.    Look before you eat: Before you eat, look at your food, its portion size, and presentation. Breathe deeply. Look again before taking a mouthful.    Chew every mouthful: Chewing a lot helps to thoroughly release the flavor of foods. Let food sit on your tongue. This allows your taste buds to absorb the flavor and transmit messages about your appetite to your brain.    Talk or eat: When you are talking, stop eating. When you are eating, stop talking.    Stay connected: Pay attention to your body's appetite signals while you are eating.    Pause while you are eating: Think about how you are feeling about your food in terms of quality and quantity.    Know when to stop eating: Stop eating when flavor intensity declines, as it is bound to do. Do not try to polish off all of the food in front of you. Instead, aim for the moment when flavor peaks and you feel an internal “ah” of satisfaction--then stop.    Evaluate how full you are: Keep asking yourself while you are eating, “Am I still hungry?” and “Am I satisfied?”    by [facility]  and       Medication use and SEs reviewed with patient.    Return in about 3 months (around 7/10/2024) for weight management via clinic or VV.    Patient verbalizes understanding.    DOCUMENTATION OF TIME SPENT: Code selection for this visit was based on time spent : 30 minutes on date of service in preparing to see the patient, obtaining and/or reviewing separately obtained history, performing a medically appropriate examination, counseling and educating the patient/family/caregiver,  ordering medications or testing, referring and communicating with other healthcare providers, documenting clinical information in the electronic medical record, independently interpreting results and communicating results to the patient/family/caregiver and care coordination with the patient's other providers.    Answers submitted by the patient for this visit:  Medical Weight Loss Follow Up (Submitted on 4/10/2024)  If greater than 5/1O how would you grade your coping mechanisms?: moderate

## 2024-04-23 ENCOUNTER — PATIENT MESSAGE (OUTPATIENT)
Dept: INTERNAL MEDICINE CLINIC | Facility: CLINIC | Age: 34
End: 2024-04-23

## 2024-04-23 DIAGNOSIS — Z51.81 ENCOUNTER FOR THERAPEUTIC DRUG MONITORING: Primary | ICD-10-CM

## 2024-04-23 DIAGNOSIS — E66.01 CLASS 3 SEVERE OBESITY WITHOUT SERIOUS COMORBIDITY WITH BODY MASS INDEX (BMI) OF 45.0 TO 49.9 IN ADULT, UNSPECIFIED OBESITY TYPE (HCC): ICD-10-CM

## 2024-04-23 DIAGNOSIS — R63.8 CRAVING FOR PARTICULAR FOOD: ICD-10-CM

## 2024-04-24 NOTE — TELEPHONE ENCOUNTER
From: Beata Wang  To: Hazel Melendez  Sent: 4/23/2024 3:33 PM CDT  Subject: Meds    Good afternoon,     Iraj Rebolledo re prescribed the high dose phentermine for me please.   My stupid overly expensive insurance won't cover the [place] shot and even with the savings coupon she showed me I can't afford $3100 a month for them.    Thank you  Beata Wang

## 2024-04-24 NOTE — TELEPHONE ENCOUNTER
Please advise. Thank you.  Last office visit: 4/10/24  Next office visit: 7/25/24    Patient inquiring about going back on phentermine due to high cost of wegovy.    Plan:  Patient has lost 15# since LOV in 2/2022 off phentermine 37.5 mg daily, on Topamax 100 mg BID, off Metformin ER 1500 mg daily with a total weight loss of 28# since initial consult on 8/16/21 with initial weight of 305#.  Weight loss goal: weight loss of >50#.  Continue Topamax, remain off phentermine and Metformin ER. Add Wegovy as directed. No AOM coverage by past insurance

## 2024-04-27 RX ORDER — PHENTERMINE HYDROCHLORIDE 37.5 MG/1
37.5 TABLET ORAL EVERY MORNING
Qty: 30 TABLET | Refills: 2 | Status: SHIPPED | OUTPATIENT
Start: 2024-04-27

## 2024-07-25 ENCOUNTER — OFFICE VISIT (OUTPATIENT)
Dept: INTERNAL MEDICINE CLINIC | Facility: CLINIC | Age: 34
End: 2024-07-25
Payer: COMMERCIAL

## 2024-07-25 VITALS
BODY MASS INDEX: 45.07 KG/M2 | RESPIRATION RATE: 16 BRPM | WEIGHT: 264 LBS | HEIGHT: 64 IN | HEART RATE: 70 BPM | SYSTOLIC BLOOD PRESSURE: 116 MMHG | DIASTOLIC BLOOD PRESSURE: 82 MMHG

## 2024-07-25 DIAGNOSIS — Z51.81 ENCOUNTER FOR THERAPEUTIC DRUG MONITORING: Primary | ICD-10-CM

## 2024-07-25 DIAGNOSIS — E66.01 CLASS 3 SEVERE OBESITY WITHOUT SERIOUS COMORBIDITY WITH BODY MASS INDEX (BMI) OF 45.0 TO 49.9 IN ADULT, UNSPECIFIED OBESITY TYPE (HCC): ICD-10-CM

## 2024-07-25 DIAGNOSIS — R63.8 CRAVING FOR PARTICULAR FOOD: ICD-10-CM

## 2024-07-25 PROCEDURE — 3079F DIAST BP 80-89 MM HG: CPT | Performed by: NURSE PRACTITIONER

## 2024-07-25 PROCEDURE — 3074F SYST BP LT 130 MM HG: CPT | Performed by: NURSE PRACTITIONER

## 2024-07-25 PROCEDURE — 99213 OFFICE O/P EST LOW 20 MIN: CPT | Performed by: NURSE PRACTITIONER

## 2024-07-25 PROCEDURE — 3008F BODY MASS INDEX DOCD: CPT | Performed by: NURSE PRACTITIONER

## 2024-07-25 RX ORDER — TOPIRAMATE 100 MG/1
100 TABLET, FILM COATED ORAL 2 TIMES DAILY
Qty: 180 TABLET | Refills: 1 | Status: CANCELLED | OUTPATIENT
Start: 2024-07-25

## 2024-07-25 RX ORDER — PHENTERMINE HYDROCHLORIDE 37.5 MG/1
37.5 TABLET ORAL EVERY MORNING
Qty: 30 TABLET | Refills: 3 | Status: SHIPPED | OUTPATIENT
Start: 2024-07-25

## 2024-07-25 NOTE — PATIENT INSTRUCTIONS
Continue making lifestyle changes that focus on good nutrition, regular exercise and stress management.    Medication Plan: Continue current medication regimen. Consider restarting Metformin ER in the future if you hit a weight plateau this month after lifestyle interventions.    Next steps to work on before next office visit include: Continue making healthy food choices and building a healthy routine. Tips for beating the weight loss plateau reviewed: IF, adding strength training.    Stopped Losing Weight? Fight Your Way through a Weight Plateau  March 12, 2019  Posted in Blog, Motivation  By [employer] [facility]     You've managed your food intake, had your fair share of sweat sessions, and you're on a roll with weight-loss. Congrats! Confidence and determination have propelled you to success.  But all of a sudden, the scale stops moving and will no long budge. You haven't slacked off, so what's the deal? You might have found yourself in a frustrating weight plateau.  What's a Weight Plateau?  Plateaus happen when you stop losing weight, even if you're doing everything “right” -- cutting back on portion sizes, eating healthy foods and working out like your life depends on it.  The sudden halt in progress might seem surprising if your initial weight-loss was fast and relatively simple. However, plateaus are common and sometimes inevitable if your metabolism has declined -- usually from muscle-loss and significant changes to your body weight. This means you're burning less calories than before, even if you're doing the same thing.  Tips to Fight back  Weight plateaus can be trying, especially if your motivation was fed by seeing progress on the scale. Still, you haven't done anything wrong. You just need a new game plan!  Are You Building Muscle?  Strength training builds muscle which boosts your metabolism and burns far more calories over time than cardio. Try the “progressive overload” approach of  gradually increasing stressed placed on the body during weight training. This includes slowly adding weight and reps.  Adjust Your Calorie Intake  After weight-loss, you may be burning less calories if your metabolism has decreased. So, you might also need to consume less calories. Re-examine and adjust your food behavior.  Embrace Change in Your Routine  Try a new workout in a new location, preferably something invigorating that you've never done before. Also consider some new recipes to prevent meals from getting boring. Changes to your normal routine are fresh, exciting and usually very motivating.  Manage Stress  Stress can often put the brakes on weight-loss by triggering food cravings and releasing the hormone cortisol. Identify healthy and sustainable stress management techniques  to remain relaxed, focused and balanced in all aspects and areas of your body.  Eat More Protein  Protein burns more calories during digestion than other nutrients, and some studies show it to have fat-burning catalysts. It also keeps you full and aids in building lean muscle mass.  Ensure You're Properly Hydrated  Even when you're mildly dehydrated, your body can crave food despite a true lack in hunger. You should strive for at least  fluid ounces of water each day (even more when you exercise) and replace coffee, tea and alcoholic beverages with water when you can.  Plateaus Can be Conquered  If you've still got weight to lose that will benefit your health, don't feel discouraged in the midst of a plateau. It happens, and it's a sign that you've already made great progress!  Instead, try thinking of the plateau as your next great challenge. When you re-work your mindset and get outside your comfort zone, you'll continue to surprise yourself.    Intermittent Fasting for Your Health: What it is and How it Works    Meal timing…Does it make a difference? Many say yes and are now looking at the timing of when they do and don’t  eat to improve their health. Intermittent fasting is gaining popularity and new information about it continues to arise.    What is Intermittent Fasting?  Intermittent fasting (IF) is an eating pattern that cycles between periods of fasting and eating. Instead of focusing on what to eat, it focuses on when you eat. During periods of fasting, food is not eaten, but calorie-free beverages are allowed so you can stay hydrated. Supporters relate this practice back to hunters and gatherers because they didn’t have regular access to food and therefore ate sporadically. Your body processes the food for the short time you are eating and then has a longer period of time to fast. This is where health benefits occur.    Health Benefits  New research about IF is emerging rapidly, but there is already evidence that suggests health benefits.    Weight-loss is one of the biggest benefits. People typically eat less when they restrict calories to a smaller window of time. After not having food for several hours, the body burns its carbohydrate stores and starts burning fat.  Metabolically, studies show improvements in heart rate, blood pressure and risk of diabetes.  Fasting can reduce the risk for inflammation.    Types of Intermittent Fasting  There can be many variations of IF, but these are the most common:    16-8: With this method, eating occurs for 8 hours and fasting occurs for 16. People commonly make their eating window from 10AM-6PM and fast during the rest of the day.  5-2: With this plan, food is restricted to 500 calories per day during two days of the week. Normal eating resumes on the other days.  24-hours: With this method, fasting occurs for 24 hours one or two days per week.    How to Make IF Work for You  Take it slow. Instead of jumping in head-first, take your time. Rather than starting with the 16-8 method, for example, start by not eating anymore after dinner. That on its own can have huge health benefits. If  you want to try the 5-2 method, start by cutting your calories by a third. It’s okay if you take smaller steps to meet your goals.  Don’t “make up” for the times you are not eating. Some eat as much as they can during the eating window. Focus on eating normally and choose lean proteins, fruits, vegetables and whole grains. Over time, your body will adjust.  Find a window that works for you. Everyone is different and your window for eating may be too. It might take time to find a pattern that works for you.  IF is not for everyone. Some struggle with IF and that’s okay. If a restrictive eating plan isn’t for you, move on. This is just one strategy. People who have diabetes, are breastfeeding or who have other health conditions may choose a different plan.    Above taken from March 31, 2022  Posted in Blog, Nutrition  By [employer] [facility]      Additional notes on Intermittent Fasting:    Time restricted eating: Eat only in a 6-8 hour window, fast for remainder of 24 hour period consuming non caloric beverages over the fast window. I recommend 11am-7pm for 8 hour window and 12pm-6pm for 6 hour window. I recommend the [employer] Zero- Fasting Tracker to help support and provide accountability. Studies show a calorie deficit of around 300 for an 8 hour eating window and 500 for a 6 hour eating window.    For more information check out the books by Dr. Potts titled The Complete Guide to Fasting and Dr. Gabrielle Max titled Fast Like a Girl.     the Weights and Get Off the Treadmill for Faster Results    by Segundo Cortez, PhD, CISSN, ACE-CPT at [facility] https://www.obesityaction.org/resources/pick-up-the-weights/    Working out and improving your fitness can seem like a daunting task when you don’t know where to begin, which type of exercise is best, or how long you should work out. I want to help clear up any confusion and tell you what I tell my own clients - strength training is the most important thing you  can do. Even if you only have 30 minutes.    The benefits of strength training are tremendous and superior in the long run (pun intended) to cardiorespiratory exercise. Cardio activities like running, cycling, rowing and others are extremely important, but if you are looking to prioritize what you do for physical activity, strength training is crucial.    What is Strength Training?    Strength training is a physical exercise that uses resistance to build muscular strength and endurance. You can use free weights, exercise machines, resistance bands or your own body weight!    Strength training can help you build muscle mass, increase bone density and improve your metabolism. As a bonus, beginners will have greater gains early on than someone who’s been strength training for many years.    What strength training does to improve your health:    Burn more fat: Muscle is more metabolically active than fat, so the more you have, the more calories you burn all day - even at the same activity level. Don’t believe the myth that a pound of muscle burns 50 calories more a day than fat. The math isn’t accurate; however, muscle does burn more energy and requires more calories during exercise, so moving them more in the gym and throughout your day will burn more calories.    Prevent injury: Strong muscles mean strong, supported bones and connective tissue, which will all help your body withstand higher levels of physical stress without injury. This is a positive side effect at any age, but especially as we get older and are more prone to falling.    Improve overall health: Studies show resistance training can enhance heart and bone health, reduce blood pressure, lower cholesterol, increase bone density, reduce low back pain, improve sleep, and ease symptoms of arthritis and fibromyalgia. Getting older isn’t easy, but the longer you can stay healthy and active, the better.    Improve mood: Strength training releases feel-good  endorphins, which reduce anxiety and can even help fight depression. Beyond supporting mental health through endorphins, strength training will help improve your self-esteem and body image.    Now that you know the numerous benefits of strength training, the next step is getting started. Always start with the basics and remember that it’s better to establish a healthy habit than it is to worry about being perfect. The perfect workout is the one that is right for you at this moment in your life.    Here are some simple steps to get you started:    Start with bodyweight exercises like squats, lunges, push-ups and planks.  Gradually add weight to your routine as you get stronger.  Focus on compound exercises that work multiple muscle groups at once. Compound exercises involve more than one joint. For example, a squat involves the hip, knee and ankle.  Aim for 2-3 strength training sessions per week.  If you are relatively new to the gym life, you may feel a bit intimidated or even uncomfortable with strength training. Don’t let ‘gym-timidation’ take away from your fitness goals. There are many ways to overcome that feeling and crush those workouts.    Start small: Begin with short workouts at home or outside.    Find a workout moisés: Partner up to make workouts more enjoyable and less intimidating.    Try group fitness classes: Find your favorite class led by an instructor who can motivate and guide you through your workout. Group classes are great for beginners because you don’t feel like you are struggling alone. You may even find a workout partner for future sessions.    Hire a : Invest in yourself and hire a  for customized training sessions to help meet your needs and goals. You can even hire me to help you get comfortable with those home strength-training workouts.    If going to the gym seems like an impossibility at this point, don’t worry -- you’re not alone. About one-third of  respondents in a recent study said they’re too self-conscious to join a gym. If this sounds like you, I’d recommend you visit and ‘interview’ several gyms or fitness studios before deciding that going to a gym isn’t for you. Visit at a time you would normally go to see how crowded it is, what they offer, what amenities they have, and whether they specialize in specific activities such as strength training or boxing.

## 2024-07-25 NOTE — PROGRESS NOTES
Beata Wang is a 34 year old female presents today for follow-up on medical weight loss program for the treatment of overweight, obesity, or morbid obesity.    S:  Current weight   Wt Readings from Last 6 Encounters:   07/25/24 264 lb (119.7 kg)   04/10/24 277 lb (125.6 kg)   09/14/23 288 lb (130.6 kg)   07/13/23 292 lb (132.5 kg)   02/22/23 295 lb (133.8 kg)   01/14/23 289 lb (131.1 kg)    AND BMI Body mass index is 45.32 kg/m²..    Patient has lost 13# since LOV 3 months ago. She has been consistent with medication, back on phentermine as insurance does not cover AOMs. Feels at a weight plateau this past month.    Testing/consult completed since LOV: Dietician: Uma on 7/13/2023. Estimated caloric needs for weight loss: 0532-5362 cals/d for 1-2 pounds/week weight loss.    [facility] Follow Up    Question 7/25/2024  1:57 PM CDT - Filed by Patient   Please describe a success moment: Eating smaller meals   Please describe a challenging moment/needs for improvement: Not losing weight   Please complete this 24 hour food journal, listing everything you had to eat in the past day. Include the average time of day you ate these meals at    List foods, qty and prep for breakfast: Blueberries   List foods, qty and prep for lunch. Salad with tomatoes   List foods, qty and prep for dinner. Various protein   List foods, qty and prep for snacks. Grapes   List the types and qty of fluids consumed Water or gaterade   On average, how many meals did you eat out per week? 2   Exercise    How many days per week are you active or exercise 2   On average, how many days were anaerobic (strength/resistance) exercises performed? 0   On average, how many days were aerobic (cardio) exercises performed? 2   Perceived level of exertion on a scale of 1-5, with 5 being very intense: 2   Stress    Average stress level on a scale of 1-10, with 10 being extremely stressed: 7   If greater than 5/1O how would you grade your coping  mechanisms? moderate   Sleep hours and integrity    How many hours of uninterrupted sleep do you get a night: 5   Do you feel rested in the morning:    If no, what may have been disrupting your sleep? My mind wont shut off   Please list any goal(s) for your next visit Lose more weight     Social hx and PMH reviewed. Employed in [employer], working days with a 2nd job T/Th evenings and weekends.  with son.    REVIEW OF SYSTEMS:  GENERAL: feels well otherwise  LUNGS: denies shortness of breath with exertion  CARDIOVASCULAR: denies chest pain on exertion, denies palpitations or pedal edema  GI: denies abdominal pain.  No N/V/D/C  MUSCULOSKELETAL: no acute joint or muscle pain  NEURO: denies headaches, migraines controlled  PSYCH: denies change in behavior or mood, denies feeling sad or depressed    EXAM:  /82   Pulse 70   Resp 16   Ht 5' 4\" (1.626 m)   Wt 264 lb (119.7 kg)   LMP 04/03/2024 (Exact Date)   BMI 45.32 kg/m²   GENERAL: well developed, well nourished, in no apparent distress, morbidly obese  EYES: conjunctiva pink, sclera non icteric, PERRLA  LUNGS: CTA in all fields, breathing non labored  CARDIO: RRR without murmur, normal S1 and S2 without clicks or gallops, no pedal edema.  GI: +BS  NEURO/MS: motor and sensory grossly intact  PSYCH: pleasant, cooperative, slightly tearing when discussing love of self    ASSESSMENT AND PLAN:  Reviewed Initial Weight Data and Goal Weight Loss:       Encounter Diagnoses   Name Primary?    Encounter for therapeutic drug monitoring Yes    Class 3 severe obesity without serious comorbidity with body mass index (BMI) of 45.0 to 49.9 in adult, unspecified obesity type (HCC)     Craving for particular food          No orders of the defined types were placed in this encounter.      Meds & Refills for this Visit:  Requested Prescriptions     Signed Prescriptions Disp Refills    Phentermine HCl 37.5 MG Oral Tab 30 tablet 3     Sig: Take 1 tablet (37.5  mg total) by mouth every morning.       Imaging & Consults:  None      Plan:  Patient has lost 13# since LOV 3 months ago on Topamax 100 mg BID, phentermine 37.5 mg daily with a total weight loss of 41# since initial consult on 8/16/21 with initial weight of 305#.  Weight loss goal: weight loss of >50#.  CPM. Hx of Metformin ER. No AOM coverage. Hx of naltrexone and Wellbutrin XL.  on breaking the weight loss plateau. See patient instructions below for additional plans and patient counseling.      Patient Instructions   Continue making lifestyle changes that focus on good nutrition, regular exercise and stress management.    Medication Plan: Continue current medication regimen. Consider restarting Metformin ER in the future if you hit a weight plateau this month after lifestyle interventions.    Next steps to work on before next office visit include: Continue making healthy food choices and building a healthy routine. Tips for beating the weight loss plateau reviewed: IF, adding strength training.    Stopped Losing Weight? Fight Your Way through a Weight Plateau  March 12, 2019  Posted in Blog, Motivation  By Your [employer] [facility]     You've managed your food intake, had your fair share of sweat sessions, and you're on a roll with weight-loss. Congrats! Confidence and determination have propelled you to success.  But all of a sudden, the scale stops moving and will no long budge. You haven't slacked off, so what's the deal? You might have found yourself in a frustrating weight plateau.  What's a Weight Plateau?  Plateaus happen when you stop losing weight, even if you're doing everything “right” -- cutting back on portion sizes, eating healthy foods and working out like your life depends on it.  The sudden halt in progress might seem surprising if your initial weight-loss was fast and relatively simple. However, plateaus are common and sometimes inevitable if your metabolism has declined -- usually  from muscle-loss and significant changes to your body weight. This means you're burning less calories than before, even if you're doing the same thing.  Tips to Fight back  Weight plateaus can be trying, especially if your motivation was fed by seeing progress on the scale. Still, you haven't done anything wrong. You just need a new game plan!  Are You Building Muscle?  Strength training builds muscle which boosts your metabolism and burns far more calories over time than cardio. Try the “progressive overload” approach of gradually increasing stressed placed on the body during weight training. This includes slowly adding weight and reps.  Adjust Your Calorie Intake  After weight-loss, you may be burning less calories if your metabolism has decreased. So, you might also need to consume less calories. Re-examine and adjust your food behavior.  Embrace Change in Your Routine  Try a new workout in a new location, preferably something invigorating that you've never done before. Also consider some new recipes to prevent meals from getting boring. Changes to your normal routine are fresh, exciting and usually very motivating.  Manage Stress  Stress can often put the brakes on weight-loss by triggering food cravings and releasing the hormone cortisol. Identify healthy and sustainable stress management techniques  to remain relaxed, focused and balanced in all aspects and areas of your body.  Eat More Protein  Protein burns more calories during digestion than other nutrients, and some studies show it to have fat-burning catalysts. It also keeps you full and aids in building lean muscle mass.  Ensure You're Properly Hydrated  Even when you're mildly dehydrated, your body can crave food despite a true lack in hunger. You should strive for at least  fluid ounces of water each day (even more when you exercise) and replace coffee, tea and alcoholic beverages with water when you can.  Plateaus Can be Conquered  If you've  still got weight to lose that will benefit your health, don't feel discouraged in the midst of a plateau. It happens, and it's a sign that you've already made great progress!  Instead, try thinking of the plateau as your next great challenge. When you re-work your mindset and get outside your comfort zone, you'll continue to surprise yourself.    Intermittent Fasting for Your Health: What it is and How it Works    Meal timing…Does it make a difference? Many say yes and are now looking at the timing of when they do and don’t eat to improve their health. Intermittent fasting is gaining popularity and new information about it continues to arise.    What is Intermittent Fasting?  Intermittent fasting (IF) is an eating pattern that cycles between periods of fasting and eating. Instead of focusing on what to eat, it focuses on when you eat. During periods of fasting, food is not eaten, but calorie-free beverages are allowed so you can stay hydrated. Supporters relate this practice back to hunters and gatherers because they didn’t have regular access to food and therefore ate sporadically. Your body processes the food for the short time you are eating and then has a longer period of time to fast. This is where health benefits occur.    Health Benefits  New research about IF is emerging rapidly, but there is already evidence that suggests health benefits.    Weight-loss is one of the biggest benefits. People typically eat less when they restrict calories to a smaller window of time. After not having food for several hours, the body burns its carbohydrate stores and starts burning fat.  Metabolically, studies show improvements in heart rate, blood pressure and risk of diabetes.  Fasting can reduce the risk for inflammation.    Types of Intermittent Fasting  There can be many variations of IF, but these are the most common:    16-8: With this method, eating occurs for 8 hours and fasting occurs for 16. People commonly make  their eating window from 10AM-6PM and fast during the rest of the day.  5-2: With this plan, food is restricted to 500 calories per day during two days of the week. Normal eating resumes on the other days.  24-hours: With this method, fasting occurs for 24 hours one or two days per week.    How to Make IF Work for You  Take it slow. Instead of jumping in head-first, take your time. Rather than starting with the 16-8 method, for example, start by not eating anymore after dinner. That on its own can have huge health benefits. If you want to try the 5-2 method, start by cutting your calories by a third. It’s okay if you take smaller steps to meet your goals.  Don’t “make up” for the times you are not eating. Some eat as much as they can during the eating window. Focus on eating normally and choose lean proteins, fruits, vegetables and whole grains. Over time, your body will adjust.  Find a window that works for you. Everyone is different and your window for eating may be too. It might take time to find a pattern that works for you.  IF is not for everyone. Some struggle with IF and that’s okay. If a restrictive eating plan isn’t for you, move on. This is just one strategy. People who have diabetes, are breastfeeding or who have other health conditions may choose a different plan.    Above taken from March 31, 2022  Posted in Blog, Nutrition  By Your Weight [employer] [facility]      Additional notes on Intermittent Fasting:    Time restricted eating: Eat only in a 6-8 hour window, fast for remainder of 24 hour period consuming non caloric beverages over the fast window. I recommend 11am-7pm for 8 hour window and 12pm-6pm for 6 hour window. I recommend the laury Zero- Fasting Tracker to help support and provide accountability. Studies show a calorie deficit of around 300 for an 8 hour eating window and 500 for a 6 hour eating window.    For more information check out the books by Dr. Potts titled The Complete Guide to Fasting  and Dr. Gabrielle Max titled Fast Like a Girl.     the Weights and Get Off the Treadmill for Faster Results    by Segundo Cortez, PhD, CISSMARIA GUADALUPE, ACE-CPT at [facility] Resources https://www.obesityaction.org/resources/pick-up-the-weights/    Working out and improving your fitness can seem like a daunting task when you don’t know where to begin, which type of exercise is best, or how long you should work out. I want to help clear up any confusion and tell you what I tell my own clients - strength training is the most important thing you can do. Even if you only have 30 minutes.    The benefits of strength training are tremendous and superior in the long run (pun intended) to cardiorespiratory exercise. Cardio activities like running, cycling, rowing and others are extremely important, but if you are looking to prioritize what you do for physical activity, strength training is crucial.    What is Strength Training?    Strength training is a physical exercise that uses resistance to build muscular strength and endurance. You can use free weights, exercise machines, resistance bands or your own body weight!    Strength training can help you build muscle mass, increase bone density and improve your metabolism. As a bonus, beginners will have greater gains early on than someone who’s been strength training for many years.    What strength training does to improve your health:    Burn more fat: Muscle is more metabolically active than fat, so the more you have, the more calories you burn all day - even at the same activity level. Don’t believe the myth that a pound of muscle burns 50 calories more a day than fat. The math isn’t accurate; however, muscle does burn more energy and requires more calories during exercise, so moving them more in the gym and throughout your day will burn more calories.    Prevent injury: Strong muscles mean strong, supported bones and connective tissue, which will all help your body withstand higher  levels of physical stress without injury. This is a positive side effect at any age, but especially as we get older and are more prone to falling.    Improve overall health: Studies show resistance training can enhance heart and bone health, reduce blood pressure, lower cholesterol, increase bone density, reduce low back pain, improve sleep, and ease symptoms of arthritis and fibromyalgia. Getting older isn’t easy, but the longer you can stay healthy and active, the better.    Improve mood: Strength training releases feel-good endorphins, which reduce anxiety and can even help fight depression. Beyond supporting mental health through endorphins, strength training will help improve your self-esteem and body image.    Now that you know the numerous benefits of strength training, the next step is getting started. Always start with the basics and remember that it’s better to establish a healthy habit than it is to worry about being perfect. The perfect workout is the one that is right for you at this moment in your life.    Here are some simple steps to get you started:    Start with bodyweight exercises like squats, lunges, push-ups and planks.  Gradually add weight to your routine as you get stronger.  Focus on compound exercises that work multiple muscle groups at once. Compound exercises involve more than one joint. For example, a squat involves the hip, knee and ankle.  Aim for 2-3 strength training sessions per week.  If you are relatively new to the gym life, you may feel a bit intimidated or even uncomfortable with strength training. Don’t let ‘gym-timidation’ take away from your fitness goals. There are many ways to overcome that feeling and crush those workouts.    Start small: Begin with short workouts at home or outside.    Find a workout moisés: Partner up to make workouts more enjoyable and less intimidating.    Try group fitness classes: Find your favorite class led by an instructor who can motivate and  guide you through your workout. Group classes are great for beginners because you don’t feel like you are struggling alone. You may even find a workout partner for future sessions.    Hire a : Invest in yourself and hire a  for customized training sessions to help meet your needs and goals. You can even hire me to help you get comfortable with those home strength-training workouts.    If going to the gym seems like an impossibility at this point, don’t worry -- you’re not alone. About one-third of respondents in a recent study said they’re too self-conscious to join a gym. If this sounds like you, I’d recommend you visit and ‘interview’ several gyms or fitness studios before deciding that going to a gym isn’t for you. Visit at a time you would normally go to see how crowded it is, what they offer, what amenities they have, and whether they specialize in specific activities such as strength training or boxing.        Medication use and SEs reviewed with patient.    Return in about 4 months (around 11/25/2024) for weight management via clinic or VV.    Patient verbalizes understanding.    DOCUMENTATION OF TIME SPENT: Code selection for this visit was based on time spent : 25 minutes on date of service in preparing to see the patient, obtaining and/or reviewing separately obtained history, performing a medically appropriate examination, counseling and educating the patient/family/caregiver, ordering medications or testing, referring and communicating with other healthcare providers, documenting clinical information in the electronic medical record, independently interpreting results and communicating results to the patient/family/caregiver and care coordination with the patient's other providers.    Answers submitted by the patient for this visit:  Medical Weight Loss Follow Up (Submitted on 7/25/2024)  If greater than 5/1O how would you grade your coping mechanisms?: moderate

## 2024-11-19 ENCOUNTER — TELEMEDICINE (OUTPATIENT)
Dept: INTERNAL MEDICINE CLINIC | Facility: CLINIC | Age: 34
End: 2024-11-19
Payer: COMMERCIAL

## 2024-11-19 DIAGNOSIS — E66.01 CLASS 3 SEVERE OBESITY WITHOUT SERIOUS COMORBIDITY WITH BODY MASS INDEX (BMI) OF 45.0 TO 49.9 IN ADULT, UNSPECIFIED OBESITY TYPE (HCC): ICD-10-CM

## 2024-11-19 DIAGNOSIS — F32.A ANXIETY AND DEPRESSION: ICD-10-CM

## 2024-11-19 DIAGNOSIS — R63.8 CRAVING FOR PARTICULAR FOOD: ICD-10-CM

## 2024-11-19 DIAGNOSIS — E66.813 CLASS 3 SEVERE OBESITY WITHOUT SERIOUS COMORBIDITY WITH BODY MASS INDEX (BMI) OF 45.0 TO 49.9 IN ADULT, UNSPECIFIED OBESITY TYPE (HCC): ICD-10-CM

## 2024-11-19 DIAGNOSIS — Z51.81 ENCOUNTER FOR THERAPEUTIC DRUG MONITORING: Primary | ICD-10-CM

## 2024-11-19 DIAGNOSIS — F41.9 ANXIETY AND DEPRESSION: ICD-10-CM

## 2024-11-19 PROCEDURE — 99213 OFFICE O/P EST LOW 20 MIN: CPT | Performed by: NURSE PRACTITIONER

## 2024-11-19 RX ORDER — PHENTERMINE HYDROCHLORIDE 37.5 MG/1
37.5 TABLET ORAL EVERY MORNING
Qty: 30 TABLET | Refills: 3 | Status: SHIPPED | OUTPATIENT
Start: 2024-11-19

## 2024-11-19 RX ORDER — TOPIRAMATE 100 MG/1
100 TABLET, FILM COATED ORAL 2 TIMES DAILY
Qty: 180 TABLET | Refills: 1 | Status: SHIPPED | OUTPATIENT
Start: 2024-11-19

## 2024-11-19 NOTE — PROGRESS NOTES
[facility] Weight Management follow up via video visit:    Subjective    This visit is conducted using Telemedicine with live, interactive video and audio.    Chief Complaint:  Routine follow up visit for lifestyle and medical management for overweight, obesity, or morbid obesity. LOV in clinic weight: 264#.    PMH reviewed. Bariatric surgery planned or hx of surgery in the past: 9/10 interest in the past.    HPI:   Beata Wang is a 34 year old female who is being followed up today for lifestyle and medical management as deemed appropriate for overweight, obesity or morbid obesity. Patient reports weight loss monitoring at home via scale with a weight of 273#. This appears to be a weight gain since LOV 4 months ago in clinic. Patient has been consistent with medication.    Questions/Concerns/Comments since LOV: Some increase stress and comfort eating. Was seeing a therapist. Has f/u with PCP next month. Consistent with Sertraline.    Lifestyle/Social Hx Reviewed:    [facility] Follow Up    Question 11/19/2024  8:22 AM CST - Filed by Patient   Please describe a success moment: None   Please describe a challenging moment/needs for improvement: Stop eating!!   Please complete this 24 hour food journal, listing everything you had to eat in the past day. Include the average time of day you ate these meals at    List foods, qty and prep for breakfast: [employer]   List foods, qty and prep for lunch. Salad with tomatoes and cheese with dressing   List foods, qty and prep for dinner. Varies   List foods, qty and prep for snacks. Grapes   List the types and qty of fluids consumed Gatorade   On average, how many meals did you eat out per week? 2   Exercise    How many days per week are you active or exercise 0   On average, how many days were anaerobic (strength/resistance) exercises performed? 0   On average, how many days were aerobic (cardio) exercises performed? 0   Perceived level of exertion on a  scale of 1-5, with 5 being very intense: 1   Stress    Average stress level on a scale of 1-10, with 10 being extremely stressed: 7   If greater than 5/1O how would you grade your coping mechanisms? fair   Sleep hours and integrity    How many hours of uninterrupted sleep do you get a night: 4   Do you feel rested in the morning: No   If no, what may have been disrupting your sleep? Racing thoughts, cant get comfortable   Please list any goal(s) for your next visit Unknown       ROS  General: feeling well, denies fatigue  EYES: denies vision changes or high pain/pressure.  CV: denies cp, palpitations  Resp: denies sob  GI: denies abdominal pain. Denies N/V/D/C.  Neuro: denies paresthesia or cognitive changes  Psych: denies any mood changes    Physical Exam:  >   BP Readings from Last 3 Encounters:   07/25/24 116/82   04/10/24 116/78   09/14/23 124/78       Home weight: see above  Home BP: not available  Home blood sugars: n/a  Today's calculated BMI from reported weight: 46.9    General: patient speaking in full sentences, no increased work of breathing. alert, appears stated age, cooperative, no distress, and morbidly obese  HENT: normocephalic  Resp: Breathing is non labored  Psych: patient appears cheerful, smiling, making good eye contact    Diagnoses and all orders for this visit:    Encounter for therapeutic drug monitoring  -     Phentermine HCl 37.5 MG Oral Tab; Take 1 tablet (37.5 mg total) by mouth every morning.  -     topiramate 100 MG Oral Tab; Take 1 tablet (100 mg total) by mouth 2 (two) times daily.    Class 3 severe obesity without serious comorbidity with body mass index (BMI) of 45.0 to 49.9 in adult, unspecified obesity type (HCC)  -     Phentermine HCl 37.5 MG Oral Tab; Take 1 tablet (37.5 mg total) by mouth every morning.  -     topiramate 100 MG Oral Tab; Take 1 tablet (100 mg total) by mouth 2 (two) times daily.    Craving for particular food  -     Phentermine HCl 37.5 MG Oral Tab; Take 1  tablet (37.5 mg total) by mouth every morning.  -     topiramate 100 MG Oral Tab; Take 1 tablet (100 mg total) by mouth 2 (two) times daily.    Anxiety and depression        Patient Instructions   Continue making lifestyle changes that focus on good nutrition, regular exercise and stress management.    Medication Plan: Continue current medication regimen. Discuss with PCP increasing Sertraline to 100 mg daily to support mental health and reduce food for comfort potential.    Next steps to work on before next office visit include: Reviewed moving from the mindset of Food 4 Comfort and grounding to Food 4 Fuel. Some tips and tools are listed below.    Consider attending the bariatric seminar, via virtual presentation hosted 2 Monday evenings out of the month, to learn more about surgical options for weight loss.    Call [phone] or in your Internet search browser type in:    [email or web address]    Then click on Attend a FREE bariatric seminar      Re-thinking Nutrition: Learning to See Food as Fuel  October 8, 2019  Posted in Blog, Nutrition  [employer] [facility]    “Dieting” can start to feel challenging when we begin to associate healthy behaviors with “punishment.” Too often, we overlook the real reason we eat food. It's not only meant to be enjoyed, but also to provide basic fuel for our bodies.  Learning to see food as fuel can help you find balance in your journey with weight. It will teach you about the primal purpose of food, the effect certain foods have on health, and how to listen carefully to what your body is trying to tell you.  It Starts with Cells  Did you know your body has more than 35 trillion cells? Each one serves a purpose.  Once a cell has completed its purpose, it dies. Your body replaces dead and worn-out cells with new and energetic ones. It also depends on this ongoing cell cycle to keep you healthy. And  guess what? The foods you eat help shape this process.  Seeing Food as Fuel  Eating the right foods helps build and repair cells to be stronger than before. It does this by getting nutrition from whole grains, vitamins, minerals, fats, protein and other nutrients.  These essential nutrients matter greatly to every single cell in your body. They make up your:  Cell membrane  Nucleus  Mitochondria  When cells join together, they make tissue that brings life to your bones, brain, skin, nerves, muscles, etc. The health and lifespan of your cells depend on the nutrients you get from food. That is why healthy food choices are so important.   Once you can start to see food as being fuel for your body, you will get better at making the healthy choice the easy choice. Food will be less about rewards or punishments and more about supporting your overall health and happiness.  Building Blocks of Good Nutrition  A diet without enough fiber, protein and healthy fats can cause your cells to become brittle, leaky and tired. When cells can't do what they are designed to do, problems like inflammation, cancer and other difficult health conditions start to arise.  Foods that Support Healthy Cells:  Unsaturated Fats - Fish, nuts avocados, olive oil, flax seed, etc.  Protein - Poultry, lean beef, yogurt, eggs, seeds, beans, etc.  Antioxidants - Fruits, dark green veggies, sweet potatoes, tea, etc.  So, the next time you grab something to eat, ask yourself how that food helps or hurts your body. This is a part of living mindful and being knowledgeable about what you consume regularly.  When you start to see food as fuel, not just a tasty treat or the solution to a bad temper, you will start to make healthier choices more often. Making new habits is one of the building blocks to successful long-term weight management!      Holiday Weight and How to Avoid It    Obesity [employer] Coalition by Christy Fine,  PhD  https://www.obesityaction.org/resources/holiday-weight-and-gyl-uc-ohxse-it/      When we think about the holidays many things come to mind - gifts, shopping, parties, family, decorating, long to-do lists --and delicious holiday treats.    Strategies for Avoiding Holiday Weight Gain  The holiday season is a busy time, and there are eating opportunities everywhere we go, such as family gatherings, office parties with trays of home-baked treats in the lunchroom, holiday and [id number] programs at our kids’ schools, treat samples being given away as we make our way through the stores to do our holiday shopping and catalogs in our mailboxes with mouth-watering photo spreads on every page. We’re really busy, perhaps too busy to prepare the healthy meals we might otherwise prepare.    Here are a few tips that will help you negotiate this joyful time with minimum risk to your weight management goals:    Focus on maintaining your current weight. Challenging yourself to lose weight over the holidays is setting yourself up for failure.  Don’t gorge on any special holiday food because you only get to eat it once a year. With luck, you’ll still be around to enjoy it next year. On the other hand, don’t deprive yourself of anything you want to taste. Instead, take a mindful bite, savoring the sight, taste, aroma, mouth feel and sound of each special holiday treat. Eating like this leads to increased pleasure, quicker satisfaction and decreased risk for weight gain.  Avoid the trap of thinking you can eat what you want because you can just start over in the New Year. It doesn’t get any easier just because it’s January - there are always other reasons to indulge and to celebrate.  Keep up your exercise routine. This will also help reduce holiday stress.  Keep tabs on yourself. Write down what you eat, weigh yourself if you want to or try on your favorite clothes to make sure they still fit.  Create meaning beyond the food  by creating new traditions that have nothing to do with food. For example, change “in our family we always have chocolate cinnamon bread with whipped cream on Christmas morning” into “in our family we always play in the snow (or on the beach), or go for a long walk/take food and gifts to the homeless shelter on Christmas morning.”  Sometimes, eating a particular food is our way of remembering a lost loved one. If that applies to you, find another way to remember them, like sharing memories with family members.  Remember all the reasons why reaching and maintaining a healthy weight is important to you.  Remember, unless you’re an elite athlete, you’re unlikely to be able to “exercise off” weeks of overindulgence.  Strategies for Holiday Parties  Most of us love holiday parties and look forward to them all year. We get to dress up and go to nice places, spend time with our nearest and dearest, enjoy our favorite holiday music and engage in the traditions that are meaningful to us. Despite all of the excitement, parties can also be minefields when it comes to honoring our healthy lifestyle goals. When we love parties, we may over-indulge as a way of intensifying the positive emotions we’re already feeling, and when we dislike parties, we may over-indulge in an effort to distract ourselves from the emotional discomfort that we’re feeling.    Here are a few tips that will help you get through holiday parties without sabotaging your goals:    Avoid wearing baggy clothing that allows you to expand as you eat.  After you’ve eaten, stay away from the food tables at the party.  Keep your hands busy by finding a way to help out. It’s the best way to distract yourself from the food.  Avoid alcohol. When we drink, we’re more likely to abandon healthy eating.  Fill up with water and other low-calorie drinks.  Take a healthy dish for the pot luck - something you can eat: consider salad, fruit, raw vegetables and a healthy  dip.  Focus on your relationships, not on the food - learn to focus on enjoying the people and the special holiday experiences, on building special memories for yourself and your family.  Meeting new people is another good way of distracting yourself from the food. If you’re shy, simply be a good listener.  Plan ahead. The best kind of plan, when it comes to food, is about what you are going to eat - not about what you’re not going to eat. If we focus on what we can’t eat (or what we think we shouldn’t eat), this kind of thinking can set us up for failure because it simply leaves us feeling deprived.  Don’t arrive completely famished - you’ll be more likely to eat in a way you’ll later regret. Plan to eat on the light side both before and after the event. Think about your meal plan for the day, and leave yourself some room to eat at the party.  Coping with Holiday Stress  As you know, the holiday season can be joyful and stressful at the same time. There’s so much to do, being around family can sometimes be difficult and often, we set ourselves the goal of creating the “perfect” holiday. Being stressed puts us at risk for stress-based eating in an effort to cope.    Here are some strategies you can use to reduce your stress levels:    Focus on what you’re grateful for.  Practice deep breathing whenever you feel overwhelmed.  Keep up your exercise routine.  Remind yourself to do just one thing at a time.  Remember -- you cannot do more than your best.  Be willing to say “no” to some events, tasks or requests. Sometimes this is the best way we can take care of ourselves.  Create a holiday season schedule for yourself. Schedule and prioritize everything you need to get done.  Reduce your expectations - aim for “good enough,” not “perfect.”  If you’re alone during the holidays, pamper yourself and find a way to help others who are less fortunate. This will help reduce your loneliness.  If your relationships with family  members are strained, remember that over-indulging in your favorite holiday comfort foods is not going to change how they behave towards you!  Create fun times for yourself. Having fun is a great way of reducing stress!  I hope these tips will help you not just get through the holidays, but that they’ll allow you to feel reassured that you can still have a fun and meaningful time without having to sacrifice your weight management goals. Wishing you a happy, healthy and meaningful holiday season!      Sugar - It’s Not as Sweet as You Think    by Genesis Velasquez RN, BSN, CBN  OAC Summer 2014 @ https://www.obesityaction.org/resources/sugar-its-not-as-sweet-as-you-think/    According to the [place] Department of Agriculture (USDA), dietary trends from 3372-6062, sugar consumption increased by 19 percent since 1970. Today, the average American consumes 20 teaspoons or 100 pounds of sugar each year! That amounts to 300 calories a day from sugar alone. And to make matters worse (you will read why later in this article), corn syrup consumption is on the rise, increasing by 387 percent in the same period of time. The largest amount of sugar is not being consumed in ingested fruits or other natural sugars. The largest amounts of consumed sugars are in the form of High Fructose Corn Syrup (HFCS) and brown sugar. Neither occurs naturally, and both are highly processed and in nearly every processed package food you will find in your local grocery store. Many of these foods you most likely would not suspect having sugar as an additive (see quiz at bottom of page).    What is sugar?  Sugar is a simple carbohydrate, which can either be a monosaccharide or disaccharide. Monosaccharides include glucose, fructose, and galactose. These three monosaccharides can join together to make the disaccharides maltose, sucrose, and lactose. These compounds are found in the foods we eat and are collectively called “sugar.”    The following  are types of sugar and their natural source:    Most people associate the term “sugar” with the white sugar we put in coffee or iced tea. The human body uses glucose, the simplest unit of carbohydrate, as its primary fuel. Without adequate carbohydrate intake, our bodies will obtain glucose, or fuel, from another source. The possibilities include a breakdown of proteins we eat or proteins stored in our body, which may ultimately lead to muscle loss and affecting one’s metabolic rate or even malnutrition. However, our need is far below the current daily consumption.    Is sugar addictive? You be the .  When high doses of sugar are consumed, it stimulates the release of dopamine in our brains. This response makes us feel pleasure (now you know why when you feel down or depressed you may want to overindulge in sweets). The drug morphine, cocaine and sugar all stimulate the same brain receptors. This study has been proven many times in lab rat studies.    In his new and fascinating book, Salt Sugar Fat: How the Food Giants Hooked Us, Pulitzer Prize-winning  Matt Hendrix (I highly recommend this book) goes inside the world of processed and packaged foods. Simeon writes in detail about how the food industry (which is 17 percent of our economy) contributes to American’s obesity epidemic by infusing processed foods with sugar, salt, and fat to make it more addictive and pleasurable. You see now why so many continue to buy their products?    According to the [facility] Food Policy and Obesity, the average child sees 5,500 food commercials a year that advertise high sugar breakfast cereals, fast food, soft drinks, candy and snacks. According to the Federal Trade Commission, the food industry spends $1.6 billion annually to reach children through the media, including the Internet.    What is high fructose corn syrup?  High fructose corn syrup (HFCS) is an industrial food product and not “natural” or a  naturally occurring substance. It is extracted from corn stalks through a secret process. The sugars are extracted through a chemical enzymatic process resulting in HFCS.    Regular cane sugar (sucrose) is made of two-sugar molecules bound tightly together - glucose and fructose in equal amounts. The enzymes in your digestive tract must break down the sucrose into glucose and fructose, which are then absorbed into the body.    HFCS also consists of glucose and fructose, not in a 50-50 ratio, but a 55-45 fructose to glucose ratio in an unbound form. Fructose is sweeter than glucose. And HCFS is cheaper than sugar. One of the reasons is because of the government farm bill corn subsidies. Products with HFCS are much sweeter and much cheaper than products made with cane sugar.    Sugar and HFCS’ Effect on the Body  Eating sugar has a systemic effect on your entire body including increased risk for diabetes, increased appetite, weight gain, heart and liver problems, decreased immune system, certain cancers and even your brain function to name a few.    Type 2 Diabetes  Type 1 Diabetes is when one’s pancreas does not make insulin. Type 2 Diabetes is when one’s body does not utilize insulin effectively. Type 1 Diabetes is usually diagnosed at a young age. Type 2 Diabetes used to occur in adulthood and was called “Adult Onset Diabetes,” however; it has since been renamed to Type 2 Diabetes because the onset is commonly seen at a much earlier age as the obesity epidemic increases. It has been estimated that just fewer than 2,000,000 individuals were diagnosed with Type 2 diabetes in 2010.    The pancreas acts on ingested sugar by secreting insulin. Insulin is a hormone that regulates the amount of sugar in the blood. If blood sugar gets too high or too low, it could be life-threatening. An increased amount of sugar in one’s diet causes the pancreas to secrete insulin. In some individuals, this leads to an overload on the  pancreas and the development of Type 2 diabetes.    Sugar, Appetite & Weight Gain  Eating less sugar is linked with weight-loss, and eating more is linked with weight gain, according to a new review of published studies. The review lends support to the idea that advising people to limit the sugar in their diets may help lessen excess weight and obesity, the researchers conclude. “The really interesting finding is that increasing and decreasing sugar had virtually identical results (on weight), in the opposite direction of course,” says researcher HI Padilla, PhD, professor of human nutrition and medicine at the [facility] in [facility].    According to leading nutritional expert, Scot Short MD, PhD, MPH, chair of nutrition at [employer] and author of Eat, Drink and Be Healthy, “Sugar increases body weight mainly by encouraging overeating.”    Heart and Liver Damage  A study published in the journal Hepatology in late 2012 found that consumption of fructose appears to affect the availability of the energy-transferring chemical ATP in the liver, thereby increasing the risk of liver cell malfunction and death.    In another review of HFCS, The American Journal of Clinical Nutrition, Joseluis Morales, PhD, Department of Nutrition, [facility] explains that HFCS is absorbed more rapidly than regular sugar, and that it doesn’t stimulate insulin or leptin production. This prevents you from triggering the body’s signals for being full and may lead to overconsumption of total calories.    A 2012 paper in the journal Nature, brought forward the idea that limitations and warnings should be placed on sugar similar to warnings we see on alcohol. The authors showed evidence that fructose and glucose in excess can have a toxic effect on the liver as the metabolism of ethanol (the alcohol contained in alcoholic beverages) had similarities to the metabolic pathways of  fructose.    Another published study in the Journal of Nutrition in 2012, found that children who consumed high levels of fructose had lower blood levels of cardiovascular protective compounds, such as HDL cholesterol and adiponectin. Higher consumption of fructose led to higher levels of fat around the midsection, a significant risk factor for diabetes and cardiovascular disease.    Immune System  Eliminating all sugar from a cancer patient’s diet would harm healthy cells that need energy to function. For example, many fruits contain high levels of antioxidants which are known to be effective in fighting cancer; however, sugars that come from whole fruits are low in sugar. Plant-based nutrition is a benefit to our overall health including fighting or preventing cancers. These important antioxidants, phytochemicals, fiber, vitamins and minerals are found in these plant-based whole foods.    However, diets high in sugar and refined carbohydrates can lead to overweight and obesity, which indirectly increases cancer risk throughout time. Certain cancers including breast, prostate, colorectal and pancreatic are associated with obesity.    How can you avoid these unhealthy consequences of (often hidden) sugar?  The best way to avoid sugar is to not consume obvious foods that are loaded with sugar. However, as discussed in this article, there are many packaged foods that surprisingly have added sugar and the more health-damaging high fructose corn syrup.  Therefore…    Eat nature’s foods  Avoid processed food (I call these factory foods, not real foods.)  Don’t eat foods in packages (or dramatically decrease consumption)  Eat foods that rot  Eat foods that walked the earth, flew in the alan, swam in the ocean or grew in the soil     Beware!  Typically, the first ingredient on a label is the most prevalent in the food product; however, beware because there may be small amounts of many types of sugars, so none of them  end up being in the first few ingredients (top 3) of the label. Sugar is disguised as a “healthy” ingredient, such as honey, rice syrup, or even “organic dehydrated cane juice.”    Here is a list of some of the possible “sugar” code words:    Corn sweetener  Corn syrup, or corn syrup solids  Dehydrated Cane Juice  Dextrin  Dextrose  Fructose  Fruit juice concentrate  Glucose  High-fructose corn syrup  Honey  Invert sugar  Lactose  Maltodextrin  Malt syrup  Maltose  Maple syrup  Molasses  Raw sugar  Rice syrup  Saccharose  Sorghum or sorghum syrup  Sucrose  Syrup  Treacle  Turbinado sugar  Xylose    High Fructose Corn Sugar (HFCS) Quiz:    Which of the below listed foods contain High Fructose Corn Syrup?    Kraft Macaroni and Cheese  Stove Top Stuffing - Home style Herb  Nicci Sun Iced Tea  Ocean Spray Cranberry Juice  Wild Cherry Lifesavers  Robitussan Cough and Congestion  Wonderbread - White Bread  Smuckers Grape Jelly  Parrish’s Vegetable Soup  Mr & Mrs T Bloody Rachelle Mix  Nabisco Fig Newtons - Whole Grain  Nabisco Fig Newtons - Fat Free  Wishbone Classic Caesars’ Dressing  Chicken of the Sea White Tuna, [place]    Answer: All    Additional Resources:    The Truth About Sugar - documentary on sugar free on Utube @ https://youMob.[email or web address]  SUGAR: The Bitter Truth by Dr. Deng Mckeon (pediatric endocrinologist) - Lecture on sugar for free on  Utube @ [email or web address]      Patient Resources:    Personal Training/Fitness Classes/Health Coaching    [facility] in [place]: Full fitness center with group fitness and personal training located in [place].  Health Coaching with Cely Barlow, Joe Grant, and Paco Carlton at our [facility]- individual coaching to work on your health goals. Call [phone] and/or email @ [email or web address]. Free 60 minute consult when client of [facility].  HENNY Iyer @  http://www.CityTherapy. A variety of group fitness options plus various yoga classes [phone] and/or email Sofia at [email or web address]  [facility] with multiple locations: Rumble (www.Geev.Me Tech), F45 Training (www.o46xgyamdhi.High Brew Coffee), SnowBall Body Bootcamp (www.Clean Power Financep.High Brew Coffee), EdRover (www.eeGeo), The Exercise  (www.exercisecoach.com), Club PilInforama (www.clubKareo.High Brew Coffee)    Online Fitness  Fitness  on iSkoot  Fit in 10 DVD series   www.Verge Advisors  Chair exercises via Sit and Be Fit (www.sitandbefit.org) and [address] FACC Fund I (www.RentHome.ru.com) or Mahin Cortez or Shane Lovett videos on YouTube.  Hip Hop Fit with Tank Paris at www.hiphopfit.LightSail Energy    Apps for on the Go Fitness  E-House 7 Minute Workout (orange box with white 7) - free on the go HIIT training lewis  Peloton Lewis @ www.onepeloton.com    Nutrition Trackers and Programs  LoseIT! And My Fitness Pal apps and on line for tracking nutrition  NOOM - virtual health coaching  FitFoundation (healthy meals on the go) in [place] @ www.irqxkiyoptlwh4d.High Brew Coffee  Jaylin ORTIZ @ wwwLibra EntertainmentbistrFlybitsd.High Brew Coffee and Mpnhpq99 (calorie smart and low carb plans recommended) @ www.lqmxmu06.com, Metabolic Meals @ www.MyMetabolicMeals.com - individual prepared meals to go  Gobble, Blue Apron, Home , Every Plate, Sunbasket- on line meal delivery programs for preparation at home  Meal Village in [place] for homemade meals to go @ www.mealvillage.High Brew Coffee  Diet Doctor @ www.dietdoctor.com - low carb swaps  Yummly - meal prep and planning lewis (www.yummly.com)    Stress, Anxiety, Depression, Trauma  CALM meditation lewis (www.calm.com)  Headspace  Don't let anxiety run your life. Using the science of emotion regulation and mindfulness to overcome fear and worry by Shady Ozuna PsyD and Darian Sr MA.  The Zooplus Podcast (September 27, 2023): 6 Magic Words That Stop Anxiety  What  Happened to You?- a look at the impact trauma has on behavior written by Ludy Ibarra and Dr. Ralph Servin  Whole Again by Jian Cotter - discovering your true self after trauma    Mindful Eating/The Hungry Brain  Am I Hungry? Mindful eating virtual  laury (www.amihungry.com)  The Hungry Brain by Rosa Maria Lovell, PhD  Mindless Eating by John Quach  Weight Loss Surgery Will Not Treat Food Addiction by Olivia Gutierrez Ph.D    Metabolic Dysfunction, Hormones and Cravings  Why We Get Sick? By Best Chow (insulin resistance)  Your Body in Balance: The New Science of Food, Hormones, and Health by Dr. Christo Benoit  The Complete Guide to fasting by Dr. Potts  Fast Like a Girl by Dr. Gabrielle Max  The M Factor (documentary on PBS about Menopause)  Sugar, Salt & Fat by Shraddha Short, Ph.D, R.D.  The Truth About Sugar - documentary on sugar (Free on Square1 Energy, https://youeHealth Systemsu.be/[email or web address])  Presentation on SUGAR called Sugar: The Bitter Truth by Dr. Deng Mckeon (Square1 Energy) [email or web address]  Reverse Visceral Fat: #1 Way to Increase Your Lifespan & End Inflammation with Dr. Ganesh Sequeira on Utube @ https://Toopher.be/[email or web address]    Nutrition Support  You Are What You Eat - Netfix series on twin study looking at impact of nutrition changes on health  The End of Dieting: How to Live for Life by Dr. Diego Velasco M.D. or listen to The Artimi Podcast Episode 63: Understanding \"Nutritarian\" Eating w/Dr. Diego Velasco  The Game Changers- Netflix Documentary on plant based nutrition  The Dr. Alicea T5 Wellness Plan by Dr. Tyree Alicea MD  The Complete Guide to fasting by Dr. Potts  @[email or web address] (Instagram Dietician with support surrounding nutrition and meal prep/planning)    Education, Motivation and Support Resources  Live to 100: Secrets of the Blue Zones - Netflix series on the secrets to communities living over 100 years old  Atomic Habits by Yao Raymundo (a book about  taking small steps to promote greater behavior change)   Motivation laury (black box with white \")- daily supportive messages sent to your phone  Can't Hurt Me by Shady Kraft (a book exploring the power of discipline in achieving your goals)  Fed Up - documentary about obesity (Free on Utube)  Www.yourweightmatters.org - Obesity Action Coalition sponsored Blog posts  Obesity Action Coalition Resources on topics specific to weight management (www.obesityaction.org)  Fitlosophy Fitspiration - journal to better health (journal book found at [employer] in fitness aisle)  Bruce Blevins talk titled: The Call to Courage (AdStack)  The Exam Room by the Physician's Committee (Podcast)  Nutrition Facts by Dr. Lamb (Podcast)      Return in about 4 months (around 3/19/2025) for weight management via clinic.    DOCUMENTATION OF TIME SPENT: Code selection for this visit was based on time spent : 20 minutes on date of service in preparing to see the patient, obtaining and/or reviewing separately obtained history, performing a medically appropriate examination, counseling and educating the patient/family/caregiver, ordering medications or testing, referring and communicating with other healthcare providers, documenting clinical information in the electronic medical record, independently interpreting results and communicating results to the patient/family/caregiver and care coordination with the patient's other providers.  Answers submitted by the patient for this visit:  Medical Weight Loss Follow Up (Submitted on 11/19/2024)  If greater than 5/1O how would you grade your coping mechanisms?: fair

## 2024-11-19 NOTE — PATIENT INSTRUCTIONS
Continue making lifestyle changes that focus on good nutrition, regular exercise and stress management.    Medication Plan: Continue current medication regimen. Discuss with PCP increasing Sertraline to 100 mg daily to support mental health and reduce food for comfort potential.    Next steps to work on before next office visit include: Reviewed moving from the mindset of Food 4 Comfort and grounding to Food 4 Fuel. Some tips and tools are listed below.    Consider attending the bariatric seminar, via virtual presentation hosted 2 Monday evenings out of the month, to learn more about surgical options for weight loss.    Call [phone] or in your Internet search browser type in:    [email or web address]    Then click on Attend a FREE bariatric seminar      Re-thinking Nutrition: Learning to See Food as Fuel  October 8, 2019  Posted in Blog, Nutrition  By Your [employer] [facility]    “Dieting” can start to feel challenging when we begin to associate healthy behaviors with “punishment.” Too often, we overlook the real reason we eat food. It's not only meant to be enjoyed, but also to provide basic fuel for our bodies.  Learning to see food as fuel can help you find balance in your journey with weight. It will teach you about the primal purpose of food, the effect certain foods have on health, and how to listen carefully to what your body is trying to tell you.  It Starts with Cells  Did you know your body has more than 35 trillion cells? Each one serves a purpose.  Once a cell has completed its purpose, it dies. Your body replaces dead and worn-out cells with new and energetic ones. It also depends on this ongoing cell cycle to keep you healthy. And guess what? The foods you eat help shape this process.  Seeing Food as Fuel  Eating the right foods helps build and repair cells to be stronger than before. It does this by getting nutrition from  whole grains, vitamins, minerals, fats, protein and other nutrients.  These essential nutrients matter greatly to every single cell in your body. They make up your:  Cell membrane  Nucleus  Mitochondria  When cells join together, they make tissue that brings life to your bones, brain, skin, nerves, muscles, etc. The health and lifespan of your cells depend on the nutrients you get from food. That is why healthy food choices are so important.   Once you can start to see food as being fuel for your body, you will get better at making the healthy choice the easy choice. Food will be less about rewards or punishments and more about supporting your overall health and happiness.  Building Blocks of Good Nutrition  A diet without enough fiber, protein and healthy fats can cause your cells to become brittle, leaky and tired. When cells can't do what they are designed to do, problems like inflammation, cancer and other difficult health conditions start to arise.  Foods that Support Healthy Cells:  Unsaturated Fats - Fish, nuts avocados, olive oil, flax seed, etc.  Protein - Poultry, lean beef, yogurt, eggs, seeds, beans, etc.  Antioxidants - Fruits, dark green veggies, sweet potatoes, tea, etc.  So, the next time you grab something to eat, ask yourself how that food helps or hurts your body. This is a part of living mindful and being knowledgeable about what you consume regularly.  When you start to see food as fuel, not just a tasty treat or the solution to a bad temper, you will start to make healthier choices more often. Making new habits is one of the building blocks to successful long-term weight management!      Holiday Weight and How to Avoid It    Obesity Action Coalition by Christy Fine, PhD  https://www.obesityaction.org/resources/holiday-weight-and-upc-hv-eaauu-it/      When we think about the holidays many things come to mind - gifts, shopping, parties, family, decorating, long to-do lists --and delicious  holiday treats.    Strategies for Avoiding Holiday Weight Gain  The holiday season is a busy time, and there are eating opportunities everywhere we go, such as family gatherings, office parties with trays of home-baked treats in the lunchroom, holiday and [id number] programs at our kids’ schools, treat samples being given away as we make our way through the stores to do our holiday shopping and catalogs in our mailboxes with mouth-watering photo spreads on every page. We’re really busy, perhaps too busy to prepare the healthy meals we might otherwise prepare.    Here are a few tips that will help you negotiate this joyful time with minimum risk to your weight management goals:    Focus on maintaining your current weight. Challenging yourself to lose weight over the holidays is setting yourself up for failure.  Don’t gorge on any special holiday food because you only get to eat it once a year. With luck, you’ll still be around to enjoy it next year. On the other hand, don’t deprive yourself of anything you want to taste. Instead, take a mindful bite, savoring the sight, taste, aroma, mouth feel and sound of each special holiday treat. Eating like this leads to increased pleasure, quicker satisfaction and decreased risk for weight gain.  Avoid the trap of thinking you can eat what you want because you can just start over in the New Year. It doesn’t get any easier just because it’s January - there are always other reasons to indulge and to celebrate.  Keep up your exercise routine. This will also help reduce holiday stress.  Keep tabs on yourself. Write down what you eat, weigh yourself if you want to or try on your favorite clothes to make sure they still fit.  Create meaning beyond the food by creating new traditions that have nothing to do with food. For example, change “in our family we always have chocolate cinnamon bread with whipped cream on Kalie morning” into “in our family we always play in the snow  (or on the beach), or go for a long walk/take food and gifts to the homeless shelter on Kalie morning.”  Sometimes, eating a particular food is our way of remembering a lost loved one. If that applies to you, find another way to remember them, like sharing memories with family members.  Remember all the reasons why reaching and maintaining a healthy weight is important to you.  Remember, unless you’re an elite athlete, you’re unlikely to be able to “exercise off” weeks of overindulgence.  Strategies for Holiday Parties  Most of us love holiday parties and look forward to them all year. We get to dress up and go to nice places, spend time with our nearest and dearest, enjoy our favorite holiday music and engage in the traditions that are meaningful to us. Despite all of the excitement, parties can also be minefields when it comes to honoring our healthy lifestyle goals. When we love parties, we may over-indulge as a way of intensifying the positive emotions we’re already feeling, and when we dislike parties, we may over-indulge in an effort to distract ourselves from the emotional discomfort that we’re feeling.    Here are a few tips that will help you get through holiday parties without sabotaging your goals:    Avoid wearing baggy clothing that allows you to expand as you eat.  After you’ve eaten, stay away from the food tables at the party.  Keep your hands busy by finding a way to help out. It’s the best way to distract yourself from the food.  Avoid alcohol. When we drink, we’re more likely to abandon healthy eating.  Fill up with water and other low-calorie drinks.  Take a healthy dish for the pot luck - something you can eat: consider salad, fruit, raw vegetables and a healthy dip.  Focus on your relationships, not on the food - learn to focus on enjoying the people and the special holiday experiences, on building special memories for yourself and your family.  Meeting new people is another good way of  distracting yourself from the food. If you’re shy, simply be a good listener.  Plan ahead. The best kind of plan, when it comes to food, is about what you are going to eat - not about what you’re not going to eat. If we focus on what we can’t eat (or what we think we shouldn’t eat), this kind of thinking can set us up for failure because it simply leaves us feeling deprived.  Don’t arrive completely famished - you’ll be more likely to eat in a way you’ll later regret. Plan to eat on the light side both before and after the event. Think about your meal plan for the day, and leave yourself some room to eat at the party.  Coping with Holiday Stress  As you know, the holiday season can be joyful and stressful at the same time. There’s so much to do, being around family can sometimes be difficult and often, we set ourselves the goal of creating the “perfect” holiday. Being stressed puts us at risk for stress-based eating in an effort to cope.    Here are some strategies you can use to reduce your stress levels:    Focus on what you’re grateful for.  Practice deep breathing whenever you feel overwhelmed.  Keep up your exercise routine.  Remind yourself to do just one thing at a time.  Remember -- you cannot do more than your best.  Be willing to say “no” to some events, tasks or requests. Sometimes this is the best way we can take care of ourselves.  Create a holiday season schedule for yourself. Schedule and prioritize everything you need to get done.  Reduce your expectations - aim for “good enough,” not “perfect.”  If you’re alone during the holidays, pamper yourself and find a way to help others who are less fortunate. This will help reduce your loneliness.  If your relationships with family members are strained, remember that over-indulging in your favorite holiday comfort foods is not going to change how they behave towards you!  Create fun times for yourself. Having fun is a great way of reducing stress!  I hope  these tips will help you not just get through the holidays, but that they’ll allow you to feel reassured that you can still have a fun and meaningful time without having to sacrifice your weight management goals. Wishing you a happy, healthy and meaningful holiday season!      Sugar - It’s Not as Sweet as You Think    by Genesis Velasquez RN, BSN, CBN  OAC Summer 2014 @ https://www.obesityaction.org/resources/sugar-its-not-as-sweet-as-you-think/    According to the [place] Department of Agriculture (USDA), dietary trends from 9813-7381, sugar consumption increased by 19 percent since 1970. Today, the average American consumes 20 teaspoons or 100 pounds of sugar each year! That amounts to 300 calories a day from sugar alone. And to make matters worse (you will read why later in this article), corn syrup consumption is on the rise, increasing by 387 percent in the same period of time. The largest amount of sugar is not being consumed in ingested fruits or other natural sugars. The largest amounts of consumed sugars are in the form of High Fructose Corn Syrup (HFCS) and brown sugar. Neither occurs naturally, and both are highly processed and in nearly every processed package food you will find in your local grocery store. Many of these foods you most likely would not suspect having sugar as an additive (see quiz at bottom of page).    What is sugar?  Sugar is a simple carbohydrate, which can either be a monosaccharide or disaccharide. Monosaccharides include glucose, fructose, and galactose. These three monosaccharides can join together to make the disaccharides maltose, sucrose, and lactose. These compounds are found in the foods we eat and are collectively called “sugar.”    The following are types of sugar and their natural source:    Most people associate the term “sugar” with the white sugar we put in coffee or iced tea. The human body uses glucose, the simplest unit of carbohydrate, as its primary fuel.  Without adequate carbohydrate intake, our bodies will obtain glucose, or fuel, from another source. The possibilities include a breakdown of proteins we eat or proteins stored in our body, which may ultimately lead to muscle loss and affecting one’s metabolic rate or even malnutrition. However, our need is far below the current daily consumption.    Is sugar addictive? You be the .  When high doses of sugar are consumed, it stimulates the release of dopamine in our brains. This response makes us feel pleasure (now you know why when you feel down or depressed you may want to overindulge in sweets). The drug morphine, cocaine and sugar all stimulate the same brain receptors. This study has been proven many times in lab rat studies.    In his new and fascinating book, Salt Sugar Fat: How the Food Giants Hooked Us, Pulitzer Prize-winning  Matt Hendrix (I highly recommend this book) goes inside the world of processed and packaged foods. Simeon writes in detail about how the food industry (which is 17 percent of our economy) contributes to American’s obesity epidemic by infusing processed foods with sugar, salt, and fat to make it more addictive and pleasurable. You see now why so many continue to buy their products?    According to the [facility] Center for Food Policy and Obesity, the average child sees 5,500 food commercials a year that advertise high sugar breakfast cereals, fast food, soft drinks, candy and snacks. According to the Federal Trade Commission, the food industry spends $1.6 billion annually to reach children through the media, including the Internet.    What is high fructose corn syrup?  High fructose corn syrup (HFCS) is an industrial food product and not “natural” or a naturally occurring substance. It is extracted from corn stalks through a secret process. The sugars are extracted through a chemical enzymatic process resulting in HFCS.    Regular cane sugar (sucrose) is made of two-sugar  molecules bound tightly together - glucose and fructose in equal amounts. The enzymes in your digestive tract must break down the sucrose into glucose and fructose, which are then absorbed into the body.    HFCS also consists of glucose and fructose, not in a 50-50 ratio, but a 55-45 fructose to glucose ratio in an unbound form. Fructose is sweeter than glucose. And HCFS is cheaper than sugar. One of the reasons is because of the government farm bill corn subsidies. Products with HFCS are much sweeter and much cheaper than products made with cane sugar.    Sugar and HFCS’ Effect on the Body  Eating sugar has a systemic effect on your entire body including increased risk for diabetes, increased appetite, weight gain, heart and liver problems, decreased immune system, certain cancers and even your brain function to name a few.    Type 2 Diabetes  Type 1 Diabetes is when one’s pancreas does not make insulin. Type 2 Diabetes is when one’s body does not utilize insulin effectively. Type 1 Diabetes is usually diagnosed at a young age. Type 2 Diabetes used to occur in adulthood and was called “Adult Onset Diabetes,” however; it has since been renamed to Type 2 Diabetes because the onset is commonly seen at a much earlier age as the obesity epidemic increases. It has been estimated that just fewer than 2,000,000 individuals were diagnosed with Type 2 diabetes in 2010.    The pancreas acts on ingested sugar by secreting insulin. Insulin is a hormone that regulates the amount of sugar in the blood. If blood sugar gets too high or too low, it could be life-threatening. An increased amount of sugar in one’s diet causes the pancreas to secrete insulin. In some individuals, this leads to an overload on the pancreas and the development of Type 2 diabetes.    Sugar, Appetite & Weight Gain  Eating less sugar is linked with weight-loss, and eating more is linked with weight gain, according to a new review of published studies. The  review lends support to the idea that advising people to limit the sugar in their diets may help lessen excess weight and obesity, the researchers conclude. “The really interesting finding is that increasing and decreasing sugar had virtually identical results (on weight), in the opposite direction of course,” says researcher HI Padilla, PhD, professor of human nutrition and medicine at the [facility] in [facility].    According to leading nutritional expert, Scot Short MD, PhD, MPH, chair of nutrition at [employer] and author of Eat, Drink and Be Healthy, “Sugar increases body weight mainly by encouraging overeating.”    Heart and Liver Damage  A study published in the journal Hepatology in late 2012 found that consumption of fructose appears to affect the availability of the energy-transferring chemical ATP in the liver, thereby increasing the risk of liver cell malfunction and death.    In another review of HFCS, The American Journal of Clinical Nutrition, Joseluis Morales, PhD, Department of Nutrition, [facility] explains that HFCS is absorbed more rapidly than regular sugar, and that it doesn’t stimulate insulin or leptin production. This prevents you from triggering the body’s signals for being full and may lead to overconsumption of total calories.    A 2012 paper in the journal Nature, brought forward the idea that limitations and warnings should be placed on sugar similar to warnings we see on alcohol. The authors showed evidence that fructose and glucose in excess can have a toxic effect on the liver as the metabolism of ethanol (the alcohol contained in alcoholic beverages) had similarities to the metabolic pathways of fructose.    Another published study in the Journal of Nutrition in 2012, found that children who consumed high levels of fructose had lower blood levels of cardiovascular protective compounds, such as HDL cholesterol and  adiponectin. Higher consumption of fructose led to higher levels of fat around the midsection, a significant risk factor for diabetes and cardiovascular disease.    Immune System  Eliminating all sugar from a cancer patient’s diet would harm healthy cells that need energy to function. For example, many fruits contain high levels of antioxidants which are known to be effective in fighting cancer; however, sugars that come from whole fruits are low in sugar. Plant-based nutrition is a benefit to our overall health including fighting or preventing cancers. These important antioxidants, phytochemicals, fiber, vitamins and minerals are found in these plant-based whole foods.    However, diets high in sugar and refined carbohydrates can lead to overweight and obesity, which indirectly increases cancer risk throughout time. Certain cancers including breast, prostate, colorectal and pancreatic are associated with obesity.    How can you avoid these unhealthy consequences of (often hidden) sugar?  The best way to avoid sugar is to not consume obvious foods that are loaded with sugar. However, as discussed in this article, there are many packaged foods that surprisingly have added sugar and the more health-damaging high fructose corn syrup.  Therefore…    Eat nature’s foods  Avoid processed food (I call these factory foods, not real foods.)  Don’t eat foods in packages (or dramatically decrease consumption)  Eat foods that rot  Eat foods that walked the earth, flew in the alan, swam in the ocean or grew in the soil     Beware!  Typically, the first ingredient on a label is the most prevalent in the food product; however, beware because there may be small amounts of many types of sugars, so none of them end up being in the first few ingredients (top 3) of the label. Sugar is disguised as a “healthy” ingredient, such as honey, rice syrup, or even “organic dehydrated cane juice.”    Here is a list of some of the possible  “sugar” code words:    Corn sweetener  Corn syrup, or corn syrup solids  Dehydrated Cane Juice  Dextrin  Dextrose  Fructose  Fruit juice concentrate  Glucose  High-fructose corn syrup  Honey  Invert sugar  Lactose  Maltodextrin  Malt syrup  Maltose  Maple syrup  Molasses  Raw sugar  Rice syrup  Saccharose  Sorghum or sorghum syrup  Sucrose  Syrup  Treacle  Turbinado sugar  Xylose    High Fructose Corn Sugar (HFCS) Quiz:    Which of the below listed foods contain High Fructose Corn Syrup?    Kraft Macaroni and Cheese  Stove Top Stuffing - Home style Herb  Nicci Sun Iced Tea  Ocean Spray Cranberry Juice  Wild Cherry Lifesavers  Robitussan Cough and Congestion  Wonderbread - White Bread  Smuckers Grape Jelly  Parrish’s Vegetable Soup  Mr & Mrs T Bloody Rachelle Mix  Nabisco Fig Newtons - Whole Grain  Nabisco Fig Newtons - Fat Free  Wishbone Classic Caesars’ Dressing  Chicken of the Sea White Tuna, [place]    Answer: All    Additional Resources:    The Truth About Sugar - documentary on sugar free on Seaters @ https://the grafter.be/0P6qmtpBJ0r  SUGAR: The Bitter Truth by Dr. Deng Mckeon (pediatric endocrinologist) - Lecture on sugar for free on  Utube @ https://the grafter.be/dBnniua6-oM?si=[email or web address]      Patient Resources:    Personal Training/Fitness Classes/Health Coaching    [facility] in [place]: Full fitness center with group fitness and personal training located in [place].  Health Coaching with Cely Barlow, Joe Grant, and Paco Carlton at our [facility]- individual coaching to work on your health goals. Call [phone] and/or email @ [email or web address]. Free 60 minute consult when client of [facility].  HENNY Iyer @ http://www.[email or web address]. A variety of group fitness options plus various yoga classes [phone] and/or email Sofia at [email or web address]  [facility] Fitness Centers with multiple locations: Back&  (www.[email or web address].[email or web address]), F45 Training (www.[email or web address].[employer]), Fit Body Bootcamp (www.[email or web address].[employer]), MAZ Fitness (www.[email or web address]), The Exercise  (www.exercisecoach.com), Club Pilates (www.[email or web address].[employer])    Online Fitness  Fitness  on Utube  Fit in 10 DVD series   www.[email or web address]  Chair exercises via Sit and Be Fit (www.sitandbefit.org) and [employer] (www.PS DEPT..com) or Mahin Cortez or Shane Lovett videos on YouTube.  Hip Hop Fit with Fashion.me at www.hiphopfit.Where's Up    Apps for on the Go Fitness  [place] 7 Minute Workout (orange box with white 7) - free on the go HIIT training lewis  Peloton Lewis @ www.onepeloton.com    Nutrition Trackers and Programs  LoseIT! And My Fitness Pal apps and on line for tracking nutrition  NOOM - virtual health coaching  FitFoundation (healthy meals on the go) in [place] @ www.[email or web address].[employer]  Bistrcele MD @ www.bistromd.[employer] and Yrfrqn01 (calorie smart and low carb plans recommended) @ www.kotipo85.com, Metabolic Meals @ www.MyMetabolicMeals.com - individual prepared meals to go  Gobble, Blue Apron, Home , Every Plate, Sunbasket- on line meal delivery programs for preparation at home  Meal [facility] in [place] for homemade meals to go @ www.mealvillage.com  Diet Doctor @ www.dietdoctor.com - low carb swaps  Yummly - meal prep and planning lewis (www.yummly.com)    Stress, Anxiety, Depression, Trauma  CALM meditation lewis (www.calm.com)  Headspace  Don't let anxiety run your life. Using the science of emotion regulation and mindfulness to overcome fear and worry by Shady Ozuna PsyD and Darian Sr MA.  The BiiCode Podcast (September 27, 2023): 6 Magic Words That Stop Anxiety  What Happened to You?- a look at the impact trauma has on behavior written by Ludy Ibarra and Dr. Ralph Hancock Again by Jian Cotter - discovering your true self after trauma    Mindful Eating/The Hungry Brain  Am I Hungry? Mindful  eating virtual  laury (www.amihungry.com)  The Hungry Brain by Rosa Maria Lovell, PhD  Mindless Eating by John Quach  Weight Loss Surgery Will Not Treat Food Addiction by Olivia Gutierrez Ph.D    Metabolic Dysfunction, Hormones and Cravings  Why We Get Sick? By Best Chow (insulin resistance)  Your Body in Balance: The New Science of Food, Hormones, and Health by Dr. Christo Benoit  The Complete Guide to fasting by Dr. Potts  Fast Like a Girl by Dr. Gabrielle Max  The M Factor (documentary on PBS about Menopause)  Sugar, Salt & Fat by Shraddha Short, Ph.D, R.D.  The Truth About Sugar - documentary on sugar (Free on [employer], https://ApplyMap.be/8W2fkazUX0h)  Presentation on SUGAR called Sugar: The Bitter Truth by Dr. Deng Mckeon ([employer]) https://ApplyMap.be/dBnniua6-oM?si=[email or web address]  Reverse Visceral Fat: #1 Way to Increase Your Lifespan & End Inflammation with Dr. Ganesh Sequeira on Utube @ https://ApplyMap.be/nupPRnvUpJY?si=[email or web address]    Nutrition Support  You Are What You Eat - Netfix series on twin study looking at impact of nutrition changes on health  The End of Dieting: How to Live for Life by Dr. Diego Velasco M.D. or listen to The Loop[employer] Podcast Episode 63: Understanding \"Nutritarian\" Eating w/Dr. Diego Velasco  The Game Changers- Netflix Documentary on plant based nutrition  The Dr. Alicea T5 Wellness Plan by Dr. Tyree Alicea MD  The Complete Guide to fasting by Dr. Potts  @[facility] (Instagr Dietician with support surrounding nutrition and meal prep/planning)    Education, Motivation and Support Resources  Live to 100: Secrets of the Blue Zones - Netflix series on the secrets to communities living over 100 years old  Atomic Habits by Yao Raymundo (a book about taking small steps to promote greater behavior change)   Motivation laury (black box with white \")- daily supportive messages sent to your phone  Can't Hurt Me by Shady Swapnil (a book exploring the power of discipline in achieving your  goals)  Fed Up - documentary about obesity (Free on Utube)  Www.yourweightmatters.org - Obesity Action Coalition sponsored Blog posts  Obesity Action Coalition Resources on topics specific to weight management (www.obesityaction.org)  Fitlosophy Fitspiration - journal to better health (journal book found at [employer] in fitness aisle)  Bruce Blevins talk titled: The Call to Courage (Netflix)  The Exam Room by the Physician's Committee (Podcast)  Nutrition Facts by Dr. Lamb (Podcast)

## 2025-06-12 ENCOUNTER — TELEPHONE (OUTPATIENT)
Dept: INTERNAL MEDICINE CLINIC | Facility: CLINIC | Age: 35
End: 2025-06-12

## 2025-06-12 NOTE — TELEPHONE ENCOUNTER
Fax From [employer] to apply for wegovy 0.5 mg coverage  Call   ID  [phone]  OR can do on [facility]  KEY  [id number]    LAST VISIT 11/2024 and she was not on wegovy  Last order over a year ago.  No PA to be done.

## 2025-06-26 NOTE — PROGRESS NOTES
[facility] [place]  PROGRESS NOTE  Chief Complaint:   Patient presents with:  Medication Follow-Up      HPI:   This is a 33year old female with depression, anxiety and insomnia presents for follow-up.   Patient has been doing well with her medic (IMITREX) 25 MG Oral Tab Take 1 tablet (25 mg total) by mouth every 2 (two) hours as needed for Migraine.  Use at onset; repeat once after 2 HRS-ONLY 2 IN 24 HR MAX 15 tablet 1   • Zolpidem Tartrate 10 MG Oral Tab Take 1 tablet (10 mg total) by mouth nightl nontender, bowel sounds normal in all 4 quadrants, no Masses, no hepatosplenomegaly. SKIN: No rashes, no skin lesion, no bruising, good turgor. LYMPHATIC: No cervical lymphadenopathy, no other lymphadenopathy.   MUSCULOSKELETAL: Normal ROM, no joint pain, Yes

## (undated) DIAGNOSIS — Z51.81 ENCOUNTER FOR THERAPEUTIC DRUG MONITORING: ICD-10-CM

## (undated) DIAGNOSIS — G43.009 MIGRAINE WITHOUT AURA AND WITHOUT STATUS MIGRAINOSUS, NOT INTRACTABLE: ICD-10-CM

## (undated) DIAGNOSIS — E66.01 MORBID OBESITY WITH BMI OF 50.0-59.9, ADULT (HCC): ICD-10-CM

## (undated) DIAGNOSIS — R63.8 CRAVING FOR PARTICULAR FOOD: ICD-10-CM

## (undated) NOTE — LETTER
[address] .S. Count includes the [facility]. [address] [facility]. [place] [address] [place] [address], [address] [facility] [phone]            October 25, 2021      Stacie Stager  [address] [facility]  [place]      Dear Ms.

## (undated) NOTE — MR AVS SNAPSHOT
[address]  [address] [id number]  [phone]               Thank you for choosing us for your health care visit with TIFFANIE Olea.   We are glad to serve you and happy to provide you with this summary of yo Instructions:   To schedule an appointment for your radiology test please call [facility] patient scheduling at [phone]         Referral Details     Referred By    Referred To    Brii Leghorn, APN   [place]   [place] If any change in mental status, vomiting, loss of consciousness, severe headache, difficulty staying awake or aroused from sleep, change in gait--- go to ER. Neurology referral given. Call to make appt.   CT head ordered, may schedule at [address] once medical emergencies, dial 911.            Visit [facility] online at  [email or web address]

## (undated) NOTE — Clinical Note
Thank you for referring Severa Ahle to the [facility]. I met with her in consultation today. I have ordered labs, set up a nutrition consultation with our dietician, and completed an EKG.   She was started on phentermine and [place]

## (undated) NOTE — MR AVS SNAPSHOT
[address] [address] [place] [address],  [address]  [phone]               Thank you for choosing us for your health care visit with Verna Truong MD.  We are glad to serve you and happy to provide you with this summary of yo Sign up for SPOt, your secure online medical record. InVisioneer will allow you to access patient instructions from your recent visit,  view other health information, and more. To sign up or find more information, go to [email or web address] and cl increments are effective and add up over the week   2 ½ hours per week – spread out over time Use a moisés to keep you motivated   Don’t forget strength training with weights and resistance Set goals and track your progress   You don’t need to join a gym.

## (undated) NOTE — MR AVS SNAPSHOT
[address],  [address]  [phone]               Thank you for choosing us for your health care visit with Kj Crews MD.  We are glad to serve you and happy to provide you with this summary of yo Take 1 tablet (500 mg total) by mouth 2 (two) times daily with meals.    Commonly known as:  NAPROSYN           TRINESSA (28) 0.18/0.215/0.25 MG-35 MCG Tabs   Generic drug:  Norgestim-Eth Estrad Triphasic                Where to Get Your Medications      Th

## (undated) NOTE — LETTER
09/23/19        [address]      Dear Seamus Stoner records indicate that you have outstanding lab work and or testing that was ordered for you and has not yet been completed:  Orders Placed This Encounter        XR LUMBA